# Patient Record
Sex: FEMALE | URBAN - METROPOLITAN AREA
[De-identification: names, ages, dates, MRNs, and addresses within clinical notes are randomized per-mention and may not be internally consistent; named-entity substitution may affect disease eponyms.]

---

## 2018-12-03 ENCOUNTER — APPOINTMENT (RX ONLY)
Dept: URBAN - METROPOLITAN AREA CLINIC 56 | Facility: CLINIC | Age: 83
Setting detail: DERMATOLOGY
End: 2018-12-03

## 2018-12-03 DIAGNOSIS — D22 MELANOCYTIC NEVI: ICD-10-CM

## 2018-12-03 DIAGNOSIS — D18.0 HEMANGIOMA: ICD-10-CM

## 2018-12-03 DIAGNOSIS — L82.1 OTHER SEBORRHEIC KERATOSIS: ICD-10-CM

## 2018-12-03 DIAGNOSIS — L81.4 OTHER MELANIN HYPERPIGMENTATION: ICD-10-CM

## 2018-12-03 DIAGNOSIS — Z85.828 PERSONAL HISTORY OF OTHER MALIGNANT NEOPLASM OF SKIN: ICD-10-CM

## 2018-12-03 DIAGNOSIS — L57.8 OTHER SKIN CHANGES DUE TO CHRONIC EXPOSURE TO NONIONIZING RADIATION: ICD-10-CM

## 2018-12-03 PROBLEM — D48.5 NEOPLASM OF UNCERTAIN BEHAVIOR OF SKIN: Status: ACTIVE | Noted: 2018-12-03

## 2018-12-03 PROBLEM — D18.01 HEMANGIOMA OF SKIN AND SUBCUTANEOUS TISSUE: Status: ACTIVE | Noted: 2018-12-03

## 2018-12-03 PROBLEM — D22.62 MELANOCYTIC NEVI OF LEFT UPPER LIMB, INCLUDING SHOULDER: Status: ACTIVE | Noted: 2018-12-03

## 2018-12-03 PROCEDURE — 99214 OFFICE O/P EST MOD 30 MIN: CPT | Mod: 25

## 2018-12-03 PROCEDURE — ? SUNSCREEN RECOMMENDATIONS

## 2018-12-03 PROCEDURE — 69100 BIOPSY OF EXTERNAL EAR: CPT

## 2018-12-03 PROCEDURE — ? COUNSELING

## 2018-12-03 PROCEDURE — 11101: CPT

## 2018-12-03 PROCEDURE — ? BIOPSY BY SHAVE METHOD

## 2018-12-03 PROCEDURE — 11100: CPT

## 2018-12-03 ASSESSMENT — LOCATION DETAILED DESCRIPTION DERM
LOCATION DETAILED: RIGHT ANTERIOR PROXIMAL UPPER ARM
LOCATION DETAILED: RIGHT LATERAL ABDOMEN
LOCATION DETAILED: LEFT ANTECUBITAL SKIN
LOCATION DETAILED: RIGHT LATERAL SUPERIOR CHEST
LOCATION DETAILED: RIGHT ANTERIOR DISTAL UPPER ARM

## 2018-12-03 ASSESSMENT — LOCATION ZONE DERM
LOCATION ZONE: TRUNK
LOCATION ZONE: ARM

## 2018-12-03 ASSESSMENT — LOCATION SIMPLE DESCRIPTION DERM
LOCATION SIMPLE: LEFT UPPER ARM
LOCATION SIMPLE: ABDOMEN
LOCATION SIMPLE: CHEST
LOCATION SIMPLE: RIGHT UPPER ARM

## 2018-12-03 NOTE — PROCEDURE: BIOPSY BY SHAVE METHOD
Biopsy Method: Personna blade
Render Post-Care Instructions In Note?: no
Dressing: Band-Aid
Electrodesiccation Text: The wound bed was treated with electrodesiccation after the biopsy was performed.
Detail Level: Detailed
Anesthesia Type: 1% lidocaine with 1:200,000 epinephrine
X Size Of Lesion In Cm: 0
Anesthesia Volume In Cc (Will Not Render If 0): 0.5
Silver Nitrate Text: The wound bed was treated with silver nitrate after the biopsy was performed.
Billing Type: Third-Party Bill
Consent: Written consent was obtained and risks were reviewed including but not limited to scarring, infection, bleeding, scabbing, incomplete removal, nerve damage and allergy to anesthesia.
Curettage Text: The wound bed was treated with curettage after the biopsy was performed.
Hemostasis: Drysol and Monsel's
Cryotherapy Text: The wound bed was treated with cryotherapy after the biopsy was performed.
Type Of Destruction Used: Electrodesiccation and Curettage
Electrodesiccation And Curettage Text: The wound bed was treated with electrodesiccation and curettage after the biopsy was performed.
Notification Instructions: Patient will be notified of biopsy results. However, patient instructed to call the office if not contacted within 2 weeks.
Wound Care: Petrolatum
Lab: 6
Lab Facility: 3
Post-Care Instructions: I reviewed with the patient in detail post-care instructions. Patient is to  cleanse the biopsy site with mild soap and water twice a day, and then apply Vaseline (petroleum jelly) twice daily until healed.
Was A Bandage Applied: Yes
Depth Of Biopsy: dermis
Biopsy Type: H and E

## 2019-01-03 ENCOUNTER — APPOINTMENT (RX ONLY)
Dept: URBAN - METROPOLITAN AREA CLINIC 56 | Facility: CLINIC | Age: 84
Setting detail: DERMATOLOGY
End: 2019-01-03

## 2019-01-03 PROBLEM — C44.319 BASAL CELL CARCINOMA OF SKIN OF OTHER PARTS OF FACE: Status: ACTIVE | Noted: 2019-01-03

## 2019-01-03 PROCEDURE — ? EXCISION

## 2019-01-03 PROCEDURE — 11642 EXC F/E/E/N/L MAL+MRG 1.1-2: CPT

## 2019-01-03 PROCEDURE — 13132 CMPLX RPR F/C/C/M/N/AX/G/H/F: CPT

## 2019-01-10 ENCOUNTER — APPOINTMENT (RX ONLY)
Dept: URBAN - METROPOLITAN AREA CLINIC 56 | Facility: CLINIC | Age: 84
Setting detail: DERMATOLOGY
End: 2019-01-10

## 2019-01-10 DIAGNOSIS — Z48.02 ENCOUNTER FOR REMOVAL OF SUTURES: ICD-10-CM

## 2019-01-10 PROCEDURE — 99024 POSTOP FOLLOW-UP VISIT: CPT

## 2019-01-10 PROCEDURE — ? SUTURE REMOVAL (GLOBAL PERIOD)

## 2019-01-10 ASSESSMENT — LOCATION DETAILED DESCRIPTION DERM: LOCATION DETAILED: RIGHT SUPERIOR CENTRAL BUCCAL CHEEK

## 2019-01-10 ASSESSMENT — LOCATION ZONE DERM: LOCATION ZONE: FACE

## 2019-01-10 ASSESSMENT — LOCATION SIMPLE DESCRIPTION DERM: LOCATION SIMPLE: RIGHT CHEEK

## 2019-01-10 NOTE — PROCEDURE: SUTURE REMOVAL (GLOBAL PERIOD)
Add 07081 Cpt? (Important Note: In 2017 The Use Of 95542 Is Being Tracked By Cms To Determine Future Global Period Reimbursement For Global Periods): yes
Detail Level: Simple

## 2019-01-25 ENCOUNTER — RX ONLY (OUTPATIENT)
Age: 84
Setting detail: RX ONLY
End: 2019-01-25

## 2019-01-25 RX ORDER — FLUOROURACIL 2 G/40G
CREAM TOPICAL
Qty: 1 | Refills: 0 | Status: ERX

## 2019-02-05 ENCOUNTER — APPOINTMENT (RX ONLY)
Dept: URBAN - METROPOLITAN AREA CLINIC 56 | Facility: CLINIC | Age: 84
Setting detail: DERMATOLOGY
End: 2019-02-05

## 2019-02-05 VITALS — DIASTOLIC BLOOD PRESSURE: 60 MMHG | SYSTOLIC BLOOD PRESSURE: 131 MMHG | HEART RATE: 64 BPM

## 2019-02-05 PROBLEM — C44.01 BASAL CELL CARCINOMA OF SKIN OF LIP: Status: ACTIVE | Noted: 2019-02-05

## 2019-02-05 PROCEDURE — ? MOHS SURGERY

## 2019-02-05 PROCEDURE — ? REPAIR NOTE

## 2019-02-05 PROCEDURE — 13152 CMPLX RPR E/N/E/L 2.6-7.5 CM: CPT

## 2019-02-05 PROCEDURE — 17311 MOHS 1 STAGE H/N/HF/G: CPT

## 2019-02-05 NOTE — HPI: PROCEDURE (MOHS)
Has The Growth Been Previously Biopsied?: has been previously biopsied
Additional History: Patient has a history of cold sores & anxiety.

## 2019-02-05 NOTE — PROCEDURE: MOHS SURGERY
Referred To Plastics For Closure Text (Leave Blank If You Do Not Want): After obtaining clear surgical margins the patient was sent to plastics for surgical repair.  The patient understands they will receive post-surgical care and follow-up from Dr. Cabrera.

## 2019-02-05 NOTE — PROCEDURE: MOHS SURGERY
Referred To Mid-Level For Closure Text (Leave Blank If You Do Not Want): After obtaining clear surgical margins the patient was sent to Jurgen Rizo PA-C for surgical repair.  The patient understands they will receive post-surgical care and follow-up from the mid-level provider.

## 2019-06-25 ENCOUNTER — APPOINTMENT (RX ONLY)
Dept: URBAN - METROPOLITAN AREA CLINIC 56 | Facility: CLINIC | Age: 84
Setting detail: DERMATOLOGY
End: 2019-06-25

## 2019-06-25 DIAGNOSIS — D18.0 HEMANGIOMA: ICD-10-CM

## 2019-06-25 DIAGNOSIS — L82.1 OTHER SEBORRHEIC KERATOSIS: ICD-10-CM

## 2019-06-25 DIAGNOSIS — L57.8 OTHER SKIN CHANGES DUE TO CHRONIC EXPOSURE TO NONIONIZING RADIATION: ICD-10-CM

## 2019-06-25 DIAGNOSIS — Z85.828 PERSONAL HISTORY OF OTHER MALIGNANT NEOPLASM OF SKIN: ICD-10-CM

## 2019-06-25 DIAGNOSIS — L81.4 OTHER MELANIN HYPERPIGMENTATION: ICD-10-CM

## 2019-06-25 DIAGNOSIS — D22 MELANOCYTIC NEVI: ICD-10-CM

## 2019-06-25 PROBLEM — D18.01 HEMANGIOMA OF SKIN AND SUBCUTANEOUS TISSUE: Status: ACTIVE | Noted: 2019-06-25

## 2019-06-25 PROBLEM — D22.62 MELANOCYTIC NEVI OF LEFT UPPER LIMB, INCLUDING SHOULDER: Status: ACTIVE | Noted: 2019-06-25

## 2019-06-25 PROCEDURE — ? INVENTORY

## 2019-06-25 PROCEDURE — 99213 OFFICE O/P EST LOW 20 MIN: CPT

## 2019-06-25 PROCEDURE — ? ADDITIONAL NOTES

## 2019-06-25 PROCEDURE — ? SUNSCREEN RECOMMENDATIONS

## 2019-06-25 PROCEDURE — ? COUNSELING

## 2019-06-25 ASSESSMENT — LOCATION DETAILED DESCRIPTION DERM
LOCATION DETAILED: RIGHT ANTERIOR PROXIMAL UPPER ARM
LOCATION DETAILED: RIGHT LATERAL ABDOMEN
LOCATION DETAILED: RIGHT LATERAL SUPERIOR CHEST
LOCATION DETAILED: RIGHT ANTERIOR DISTAL UPPER ARM
LOCATION DETAILED: LEFT ANTECUBITAL SKIN

## 2019-06-25 ASSESSMENT — LOCATION SIMPLE DESCRIPTION DERM
LOCATION SIMPLE: LEFT UPPER ARM
LOCATION SIMPLE: CHEST
LOCATION SIMPLE: RIGHT UPPER ARM
LOCATION SIMPLE: ABDOMEN

## 2019-06-25 ASSESSMENT — LOCATION ZONE DERM
LOCATION ZONE: ARM
LOCATION ZONE: TRUNK

## 2019-06-25 NOTE — PROCEDURE: MIPS QUALITY
Quality 47: Advance Care Plan: Advance Care Planning discussed and documented; advance care plan or surrogate decision maker documented in the medical record.
Quality 431: Preventive Care And Screening: Unhealthy Alcohol Use - Screening: Patient screened for unhealthy alcohol use using a single question and scores less than 2 times per year
Detail Level: Detailed
Quality 226: Preventive Care And Screening: Tobacco Use: Screening And Cessation Intervention: Patient screened for tobacco use and is an ex/non-smoker
Quality 130: Documentation Of Current Medications In The Medical Record: Current Medications Documented

## 2019-06-25 NOTE — PROCEDURE: ADDITIONAL NOTES
Additional Notes: Patient used Fluorouracil 5% cream twice daily for six weeks for residual basal cell carcinoma. No residual basal cell carcinoma seen today.
Detail Level: Simple

## 2021-05-05 NOTE — PROCEDURE: MOHS SURGERY
Received a call today from Alicia from [facility], who is looking for clinical information to authorize a surgery.   They have not received anything as of yet. Her history, comorbids, medical necessity for the surgery, etc.      We may have gotten info that it was approved, however, it is still pending.     Phone:  [phone]  Fax # [phone]   Refer #[id number]    Need clinical info asap (8am 5/6/2021).   If not received in time the case will go to the doctor for review and this can delay approval for surgery.    Subsequent Stages Histo Method Verbiage: Using a similar technique to that described above, a thin layer of tissue was removed from all areas where tumor was visible on the previous stage.  The tissue was again oriented, mapped, dyed, and processed as above.

## 2021-06-21 ENCOUNTER — APPOINTMENT (RX ONLY)
Dept: URBAN - METROPOLITAN AREA CLINIC 56 | Facility: CLINIC | Age: 86
Setting detail: DERMATOLOGY
End: 2021-06-21

## 2021-06-21 DIAGNOSIS — L82.1 OTHER SEBORRHEIC KERATOSIS: ICD-10-CM

## 2021-06-21 DIAGNOSIS — D22 MELANOCYTIC NEVI: ICD-10-CM

## 2021-06-21 DIAGNOSIS — L57.8 OTHER SKIN CHANGES DUE TO CHRONIC EXPOSURE TO NONIONIZING RADIATION: ICD-10-CM

## 2021-06-21 DIAGNOSIS — L81.4 OTHER MELANIN HYPERPIGMENTATION: ICD-10-CM

## 2021-06-21 DIAGNOSIS — D485 NEOPLASM OF UNCERTAIN BEHAVIOR OF SKIN: ICD-10-CM

## 2021-06-21 DIAGNOSIS — Z85.828 PERSONAL HISTORY OF OTHER MALIGNANT NEOPLASM OF SKIN: ICD-10-CM

## 2021-06-21 DIAGNOSIS — D18.0 HEMANGIOMA: ICD-10-CM

## 2021-06-21 PROBLEM — D18.01 HEMANGIOMA OF SKIN AND SUBCUTANEOUS TISSUE: Status: ACTIVE | Noted: 2021-06-21

## 2021-06-21 PROBLEM — D22.5 MELANOCYTIC NEVI OF TRUNK: Status: ACTIVE | Noted: 2021-06-21

## 2021-06-21 PROBLEM — D48.5 NEOPLASM OF UNCERTAIN BEHAVIOR OF SKIN: Status: ACTIVE | Noted: 2021-06-21

## 2021-06-21 PROCEDURE — ? ADDITIONAL NOTES

## 2021-06-21 PROCEDURE — ? SUNSCREEN RECOMMENDATIONS

## 2021-06-21 PROCEDURE — 11102 TANGNTL BX SKIN SINGLE LES: CPT

## 2021-06-21 PROCEDURE — ? COUNSELING

## 2021-06-21 PROCEDURE — ? BIOPSY BY SHAVE METHOD

## 2021-06-21 PROCEDURE — 99213 OFFICE O/P EST LOW 20 MIN: CPT | Mod: 25

## 2021-06-21 PROCEDURE — ? TREATMENT REGIMEN

## 2021-06-21 ASSESSMENT — LOCATION ZONE DERM
LOCATION ZONE: LEG
LOCATION ZONE: FACE
LOCATION ZONE: TRUNK
LOCATION ZONE: ARM

## 2021-06-21 ASSESSMENT — LOCATION SIMPLE DESCRIPTION DERM
LOCATION SIMPLE: LEFT FOREARM
LOCATION SIMPLE: LEFT LOWER BACK
LOCATION SIMPLE: RIGHT FOREARM
LOCATION SIMPLE: RIGHT PRETIBIAL REGION
LOCATION SIMPLE: CHEST
LOCATION SIMPLE: LEFT PRETIBIAL REGION
LOCATION SIMPLE: LEFT CHEEK
LOCATION SIMPLE: LEFT FOREHEAD
LOCATION SIMPLE: UPPER BACK

## 2021-06-21 ASSESSMENT — LOCATION DETAILED DESCRIPTION DERM
LOCATION DETAILED: LEFT INFERIOR MEDIAL MIDBACK
LOCATION DETAILED: STERNAL NOTCH
LOCATION DETAILED: LEFT VENTRAL DISTAL FOREARM
LOCATION DETAILED: INFERIOR THORACIC SPINE
LOCATION DETAILED: RIGHT VENTRAL DISTAL FOREARM
LOCATION DETAILED: LEFT INFERIOR MEDIAL FOREHEAD
LOCATION DETAILED: RIGHT DISTAL PRETIBIAL REGION
LOCATION DETAILED: LEFT SUPERIOR MEDIAL MIDBACK
LOCATION DETAILED: SUPERIOR THORACIC SPINE
LOCATION DETAILED: LEFT INFERIOR PREAURICULAR CHEEK
LOCATION DETAILED: LEFT DISTAL PRETIBIAL REGION

## 2021-06-21 NOTE — PROCEDURE: BIOPSY BY SHAVE METHOD
Detail Level: Detailed
Depth Of Biopsy: dermis
Was A Bandage Applied: Yes
Size Of Lesion In Cm: 0
Biopsy Type: H and E
Biopsy Method: Personna blade
Anesthesia Type: 1% lidocaine with 1:200,000 epinephrine
Anesthesia Volume In Cc (Will Not Render If 0): 1
Hemostasis: Drysol and Monsel's
Wound Care: Petrolatum
Dressing: Band-Aid
Destruction After The Procedure: No
Type Of Destruction Used: Curettage
Curettage Text: The wound bed was treated with curettage after the biopsy was performed.
Cryotherapy Text: The wound bed was treated with cryotherapy after the biopsy was performed.
Electrodesiccation Text: The wound bed was treated with electrodesiccation after the biopsy was performed.
Electrodesiccation And Curettage Text: The wound bed was treated with electrodesiccation and curettage after the biopsy was performed.
Silver Nitrate Text: The wound bed was treated with silver nitrate after the biopsy was performed.
Lab: 6
Lab Facility: 3
Path Notes (To The Dermatopathologist): ***PER PROVIDER ONLY DR. CROOKED TO READ***
Consent: Written consent was obtained and risks were reviewed including but not limited to scarring, infection, bleeding, scabbing, incomplete removal, nerve damage and allergy to anesthesia.
Post-Care Instructions: I reviewed with the patient in detail post-care instructions. Patient is to keep the biopsy site clean with soapy water, and then apply vaseline once daily until healed. Patient may apply hydrogen peroxide soaks to remove any crusting.
Notification Instructions: Patient will be notified of biopsy results. However, patient instructed to call the office if not contacted within 2 weeks.
Billing Type: Third-Party Bill
Information: Selecting Yes will display possible errors in your note based on the variables you have selected. This validation is only offered as a suggestion for you. PLEASE NOTE THAT THE VALIDATION TEXT WILL BE REMOVED WHEN YOU FINALIZE YOUR NOTE. IF YOU WANT TO FAX A PRELIMINARY NOTE YOU WILL NEED TO TOGGLE THIS TO 'NO' IF YOU DO NOT WANT IT IN YOUR FAXED NOTE.

## 2021-08-04 ENCOUNTER — APPOINTMENT (RX ONLY)
Dept: URBAN - METROPOLITAN AREA CLINIC 56 | Facility: CLINIC | Age: 86
Setting detail: DERMATOLOGY
End: 2021-08-04

## 2021-08-04 VITALS — HEART RATE: 70 BPM | SYSTOLIC BLOOD PRESSURE: 134 MMHG | DIASTOLIC BLOOD PRESSURE: 54 MMHG

## 2021-08-04 PROBLEM — C44.319 BASAL CELL CARCINOMA OF SKIN OF OTHER PARTS OF FACE: Status: ACTIVE | Noted: 2021-08-04

## 2021-08-04 PROCEDURE — ? ADDITIONAL NOTES

## 2021-08-04 PROCEDURE — 17311 MOHS 1 STAGE H/N/HF/G: CPT

## 2021-08-04 PROCEDURE — ? REPAIR NOTE

## 2021-08-04 PROCEDURE — ? MOHS SURGERY

## 2021-08-04 PROCEDURE — 13132 CMPLX RPR F/C/C/M/N/AX/G/H/F: CPT

## 2021-08-04 NOTE — PROCEDURE: MOHS SURGERY
Doxycycline Counseling:  Patient counseled regarding possible photosensitivity and increased risk for sunburn.  Patient instructed to avoid sunlight, if possible.  When exposed to sunlight, patients should wear protective clothing, sunglasses, and sunscreen.  The patient was instructed to call the office immediately if the following severe adverse effects occur:  hearing changes, easy bruising/bleeding, severe headache, or vision changes.  The patient verbalized understanding of the proper use and possible adverse effects of doxycycline.  All of the patient's questions and concerns were addressed. Erivedge Pregnancy And Lactation Text: This medication is Pregnancy Category X and is absolutely contraindicated during pregnancy. It is unknown if it is excreted in breast milk. Enbrel Pregnancy And Lactation Text: This medication is Pregnancy Category B and is considered safe during pregnancy. It is unknown if this medication is excreted in breast milk. Doxepin Pregnancy And Lactation Text: This medication is Pregnancy Category C and it isn't known if it is safe during pregnancy. It is also excreted in breast milk and breast feeding isn't recommended. Acitretin Pregnancy And Lactation Text: This medication is Pregnancy Category X and should not be given to women who are pregnant or may become pregnant in the future. This medication is excreted in breast milk. Sski Pregnancy And Lactation Text: This medication is Pregnancy Category D and isn't considered safe during pregnancy. It is excreted in breast milk. Cyclophosphamide Pregnancy And Lactation Text: This medication is Pregnancy Category D and it isn't considered safe during pregnancy. This medication is excreted in breast milk. Minoxidil Counseling: Minoxidil is a topical medication which can increase blood flow where it is applied. It is uncertain how this medication increases hair growth. Side effects are uncommon and include stinging and allergic reactions. Carac Counseling:  I discussed with the patient the risks of Carac including but not limited to erythema, scaling, itching, weeping, crusting, and pain. Rifampin Counseling: I discussed with the patient the risks of rifampin including but not limited to liver damage, kidney damage, red-orange body fluids, nausea/vomiting and severe allergy. Arava Counseling:  Patient counseled regarding adverse effects of Arava including but not limited to nausea, vomiting, abnormalities in liver function tests. Patients may develop mouth sores, rash, diarrhea, and abnormalities in blood counts. The patient understands that monitoring is required including LFTs and blood counts.  There is a rare possibility of scarring of the liver and lung problems that can occur when taking methotrexate. Persistent nausea, loss of appetite, pale stools, dark urine, cough, and shortness of breath should be reported immediately. Patient advised to discontinue Arava treatment and consult with a physician prior to attempting conception. The patient will have to undergo a treatment to eliminate Arava from the body prior to conception. Xeljanz Counseling: I discussed with the patient the risks of Xeljanz therapy including increased risk of infection, liver issues, headache, diarrhea, or cold symptoms. Live vaccines should be avoided. They were instructed to call if they have any problems. Tazorac Counseling:  Patient advised that medication is irritating and drying.  Patient may need to apply sparingly and wash off after an hour before eventually leaving it on overnight.  The patient verbalized understanding of the proper use and possible adverse effects of tazorac.  All of the patient's questions and concerns were addressed. Siliq Pregnancy And Lactation Text: The risk during pregnancy and breastfeeding is uncertain with this medication. Itraconazole Pregnancy And Lactation Text: This medication is Pregnancy Category C and it isn't know if it is safe during pregnancy. It is also excreted in breast milk. Gabapentin Counseling: I discussed with the patient the risks of gabapentin including but not limited to dizziness, somnolence, fatigue and ataxia. Azithromycin Counseling:  I discussed with the patient the risks of azithromycin including but not limited to GI upset, allergic reaction, drug rash, diarrhea, and yeast infections. Humira Counseling:  I discussed with the patient the risks of adalimumab including but not limited to myelosuppression, immunosuppression, autoimmune hepatitis, demyelinating diseases, lymphoma, and serious infections.  The patient understands that monitoring is required including a PPD at baseline and must alert us or the primary physician if symptoms of infection or other concerning signs are noted. Otezla Counseling: The side effects of Otezla were discussed with the patient, including but not limited to worsening or new depression, weight loss, diarrhea, nausea, upper respiratory tract infection, and headache. Patient instructed to call the office should any adverse effect occur.  The patient verbalized understanding of the proper use and possible adverse effects of Otezla.  All the patient's questions and concerns were addressed. Xelshyannez Pregnancy And Lactation Text: This medication is Pregnancy Category D and is not considered safe during pregnancy.  The risk during breast feeding is also uncertain. Doxycycline Pregnancy And Lactation Text: This medication is Pregnancy Category D and not consider safe during pregnancy. It is also excreted in breast milk but is considered safe for shorter treatment courses. Simponi Counseling:  I discussed with the patient the risks of golimumab including but not limited to myelosuppression, immunosuppression, autoimmune hepatitis, demyelinating diseases, lymphoma, and serious infections.  The patient understands that monitoring is required including a PPD at baseline and must alert us or the primary physician if symptoms of infection or other concerning signs are noted. Hydroxyzine Counseling: Patient advised that the medication is sedating and not to drive a car after taking this medication.  Patient informed of potential adverse effects including but not limited to dry mouth, urinary retention, and blurry vision.  The patient verbalized understanding of the proper use and possible adverse effects of hydroxyzine.  All of the patient's questions and concerns were addressed. Minoxidil Pregnancy And Lactation Text: This medication has not been assigned a Pregnancy Risk Category but animal studies failed to show danger with the topical medication. It is unknown if the medication is excreted in breast milk. Bexarotene Counseling:  I discussed with the patient the risks of bexarotene including but not limited to hair loss, dry lips/skin/eyes, liver abnormalities, hyperlipidemia, pancreatitis, depression/suicidal ideation, photosensitivity, drug rash/allergic reactions, hypothyroidism, anemia, leukopenia, infection, cataracts, and teratogenicity.  Patient understands that they will need regular blood tests to check lipid profile, liver function tests, white blood cell count, thyroid function tests and pregnancy test if applicable. Thalidomide Counseling: I discussed with the patient the risks of thalidomide including but not limited to birth defects, anxiety, weakness, chest pain, dizziness, cough and severe allergy. Rifampin Pregnancy And Lactation Text: This medication is Pregnancy Category C and it isn't know if it is safe during pregnancy. It is also excreted in breast milk and should not be used if you are breast feeding. Tazorac Pregnancy And Lactation Text: This medication is not safe during pregnancy. It is unknown if this medication is excreted in breast milk. Ketoconazole Counseling:   Patient counseled regarding improving absorption with orange juice.  Adverse effects include but are not limited to breast enlargement, headache, diarrhea, nausea, upset stomach, liver function test abnormalities, taste disturbance, and stomach pain.  There is a rare possibility of liver failure that can occur when taking ketoconazole. The patient understands that monitoring of LFTs may be required, especially at baseline. The patient verbalized understanding of the proper use and possible adverse effects of ketoconazole.  All of the patient's questions and concerns were addressed. Bexarotene Pregnancy And Lactation Text: This medication is Pregnancy Category X and should not be given to women who are pregnant or may become pregnant. This medication should not be used if you are breast feeding. Gabapentin Pregnancy And Lactation Text: This medication is Pregnancy Category C and isn't considered safe during pregnancy. It is excreted in breast milk. Erythromycin Counseling:  I discussed with the patient the risks of erythromycin including but not limited to GI upset, allergic reaction, drug rash, diarrhea, increase in liver enzymes, and yeast infections. Cyclosporine Counseling:  I discussed with the patient the risks of cyclosporine including but not limited to hypertension, gingival hyperplasia,myelosuppression, immunosuppression, liver damage, kidney damage, neurotoxicity, lymphoma, and serious infections. The patient understands that monitoring is required including baseline blood pressure, CBC, CMP, lipid panel and uric acid, and then 1-2 times monthly CMP and blood pressure. Carac Pregnancy And Lactation Text: This medication is Pregnancy Category X and contraindicated in pregnancy and in women who may become pregnant. It is unknown if this medication is excreted in breast milk. Tetracycline Counseling: Patient counseled regarding possible photosensitivity and increased risk for sunburn.  Patient instructed to avoid sunlight, if possible.  When exposed to sunlight, patients should wear protective clothing, sunglasses, and sunscreen.  The patient was instructed to call the office immediately if the following severe adverse effects occur:  hearing changes, easy bruising/bleeding, severe headache, or vision changes.  The patient verbalized understanding of the proper use and possible adverse effects of tetracycline.  All of the patient's questions and concerns were addressed. Patient understands to avoid pregnancy while on therapy due to potential birth defects. Otezla Pregnancy And Lactation Text: This medication is Pregnancy Category C and it isn't known if it is safe during pregnancy. It is unknown if it is excreted in breast milk. Picato Counseling:  I discussed with the patient the risks of Picato including but not limited to erythema, scaling, itching, weeping, crusting, and pain. Cyclosporine Pregnancy And Lactation Text: This medication is Pregnancy Category C and it isn't know if it is safe during pregnancy. This medication is excreted in breast milk. Azithromycin Pregnancy And Lactation Text: This medication is considered safe during pregnancy and is also secreted in breast milk. Ketoconazole Pregnancy And Lactation Text: This medication is Pregnancy Category C and it isn't know if it is safe during pregnancy. It is also excreted in breast milk and breast feeding isn't recommended. Clofazimine Counseling:  I discussed with the patient the risks of clofazimine including but not limited to skin and eye pigmentation, liver damage, nausea/vomiting, gastrointestinal bleeding and allergy. Xolair Counseling:  Patient informed of potential adverse effects including but not limited to fever, muscle aches, rash and allergic reactions.  The patient verbalized understanding of the proper use and possible adverse effects of Xolair.  All of the patient's questions and concerns were addressed. Topical Clindamycin Counseling: Patient counseled that this medication may cause skin irritation or allergic reactions.  In the event of skin irritation, the patient was advised to reduce the amount of the drug applied or use it less frequently.   The patient verbalized understanding of the proper use and possible adverse effects of clindamycin.  All of the patient's questions and concerns were addressed. Hydroxyzine Pregnancy And Lactation Text: This medication is not safe during pregnancy and should not be taken. It is also excreted in breast milk and breast feeding isn't recommended. Erythromycin Pregnancy And Lactation Text: This medication is Pregnancy Category B and is considered safe during pregnancy. It is also excreted in breast milk. Glycopyrrolate Counseling:  I discussed with the patient the risks of glycopyrrolate including but not limited to skin rash, drowsiness, dry mouth, difficulty urinating, and blurred vision. Include Pregnancy/Lactation Warning?: No Cimzia Counseling:  I discussed with the patient the risks of Cimzia including but not limited to immunosuppression, allergic reactions and infections.  The patient understands that monitoring is required including a PPD at baseline and must alert us or the primary physician if symptoms of infection or other concerning signs are noted. Xolair Pregnancy And Lactation Text: This medication is Pregnancy Category B and is considered safe during pregnancy. This medication is excreted in breast milk. Bactrim Counseling:  I discussed with the patient the risks of sulfa antibiotics including but not limited to GI upset, allergic reaction, drug rash, diarrhea, dizziness, photosensitivity, and yeast infections.  Rarely, more serious reactions can occur including but not limited to aplastic anemia, agranulocytosis, methemoglobinemia, blood dyscrasias, liver or kidney failure, lung infiltrates or desquamative/blistering drug rashes. 5-Fu Counseling: 5-Fluorouracil Counseling:  I discussed with the patient the risks of 5-fluorouracil including but not limited to erythema, scaling, itching, weeping, crusting, and pain. Ilumya Counseling: I discussed with the patient the risks of tildrakizumab including but not limited to immunosuppression, malignancy, posterior leukoencephalopathy syndrome, and serious infections.  The patient understands that monitoring is required including a PPD at baseline and must alert us or the primary physician if symptoms of infection or other concerning signs are noted. Picato Pregnancy And Lactation Text: This medication is Pregnancy Category C. It is unknown if this medication is excreted in breast milk. Oxybutynin Counseling:  I discussed with the patient the risks of oxybutynin including but not limited to skin rash, drowsiness, dry mouth, difficulty urinating, and blurred vision. Isotretinoin Counseling: Patient should get monthly blood tests, not donate blood, not drive at night if vision affected, not share medication, and not undergo elective surgery for 6 months after tx completed. Side effects reviewed, pt to contact office should one occur. Methotrexate Counseling:  Patient counseled regarding adverse effects of methotrexate including but not limited to nausea, vomiting, abnormalities in liver function tests. Patients may develop mouth sores, rash, diarrhea, and abnormalities in blood counts. The patient understands that monitoring is required including LFT's and blood counts.  There is a rare possibility of scarring of the liver and lung problems that can occur when taking methotrexate. Persistent nausea, loss of appetite, pale stools, dark urine, cough, and shortness of breath should be reported immediately. Patient advised to discontinue methotrexate treatment at least three months before attempting to become pregnant.  I discussed the need for folate supplements while taking methotrexate.  These supplements can decrease side effects during methotrexate treatment. The patient verbalized understanding of the proper use and possible adverse effects of methotrexate.  All of the patient's questions and concerns were addressed. Tetracycline Pregnancy And Lactation Text: This medication is Pregnancy Category D and not consider safe during pregnancy. It is also excreted in breast milk. Cimzia Pregnancy And Lactation Text: This medication crosses the placenta but can be considered safe in certain situations. Cimzia may be excreted in breast milk. Topical Clindamycin Pregnancy And Lactation Text: This medication is Pregnancy Category B and is considered safe during pregnancy. It is unknown if it is excreted in breast milk. Stelara Counseling:  I discussed with the patient the risks of ustekinumab including but not limited to immunosuppression, malignancy, posterior leukoencephalopathy syndrome, and serious infections.  The patient understands that monitoring is required including a PPD at baseline and must alert us or the primary physician if symptoms of infection or other concerning signs are noted. Terbinafine Counseling: Patient counseling regarding adverse effects of terbinafine including but not limited to headache, diarrhea, rash, upset stomach, liver function test abnormalities, itching, taste/smell disturbance, nausea, abdominal pain, and flatulence.  There is a rare possibility of liver failure that can occur when taking terbinafine.  The patient understands that a baseline LFT and kidney function test may be required. The patient verbalized understanding of the proper use and possible adverse effects of terbinafine.  All of the patient's questions and concerns were addressed. Glycopyrrolate Pregnancy And Lactation Text: This medication is Pregnancy Category B and is considered safe during pregnancy. It is unknown if it is excreted breast milk. Bactrim Pregnancy And Lactation Text: This medication is Pregnancy Category D and is known to cause fetal risk.  It is also excreted in breast milk. Valtrex Counseling: I discussed with the patient the risks of valacyclovir including but not limited to kidney damage, nausea, vomiting and severe allergy.  The patient understands that if the infection seems to be worsening or is not improving, they are to call. Metronidazole Counseling:  I discussed with the patient the risks of metronidazole including but not limited to seizures, nausea/vomiting, a metallic taste in the mouth, nausea/vomiting and severe allergy. Isotretinoin Pregnancy And Lactation Text: This medication is Pregnancy Category X and is considered extremely dangerous during pregnancy. It is unknown if it is excreted in breast milk. Protopic Counseling: Patient may experience a mild burning sensation during topical application. Protopic is not approved in children less than 2 years of age. There have been case reports of hematologic and skin malignancies in patients using topical calcineurin inhibitors although causality is questionable. Albendazole Counseling:  I discussed with the patient the risks of albendazole including but not limited to cytopenia, kidney damage, nausea/vomiting and severe allergy.  The patient understands that this medication is being used in an off-label manner. Colchicine Counseling:  Patient counseled regarding adverse effects including but not limited to stomach upset (nausea, vomiting, stomach pain, or diarrhea).  Patient instructed to limit alcohol consumption while taking this medication.  Colchicine may reduce blood counts especially with prolonged use.  The patient understands that monitoring of kidney function and blood counts may be required, especially at baseline. The patient verbalized understanding of the proper use and possible adverse effects of colchicine.  All of the patient's questions and concerns were addressed. Hemigard Postcare Instructions: The HEMIGARD strips are to remain completely dry for at least 5-7 days. Cosentyx Counseling:  I discussed with the patient the risks of Cosentyx including but not limited to worsening of Crohn's disease, immunosuppression, allergic reactions and infections.  The patient understands that monitoring is required including a PPD at baseline and must alert us or the primary physician if symptoms of infection or other concerning signs are noted. Topical Sulfur Applications Counseling: Topical Sulfur Counseling: Patient counseled that this medication may cause skin irritation or allergic reactions.  In the event of skin irritation, the patient was advised to reduce the amount of the drug applied or use it less frequently.   The patient verbalized understanding of the proper use and possible adverse effects of topical sulfur application.  All of the patient's questions and concerns were addressed. Terbinafine Pregnancy And Lactation Text: This medication is Pregnancy Category B and is considered safe during pregnancy. It is also excreted in breast milk and breast feeding isn't recommended. High Dose Vitamin A Counseling: Side effects reviewed, pt to contact office should one occur. Hydroxychloroquine Counseling:  I discussed with the patient that a baseline ophthalmologic exam is needed at the start of therapy and every year thereafter while on therapy. A CBC may also be warranted for monitoring.  The side effects of this medication were discussed with the patient, including but not limited to agranulocytosis, aplastic anemia, seizures, rashes, retinopathy, and liver toxicity. Patient instructed to call the office should any adverse effect occur.  The patient verbalized understanding of the proper use and possible adverse effects of Plaquenil.  All the patient's questions and concerns were addressed. Metronidazole Pregnancy And Lactation Text: This medication is Pregnancy Category B and considered safe during pregnancy.  It is also excreted in breast milk. Methotrexate Pregnancy And Lactation Text: This medication is Pregnancy Category X and is known to cause fetal harm. This medication is excreted in breast milk. Drysol Counseling:  I discussed with the patient the risks of drysol/aluminum chloride including but not limited to skin rash, itching, irritation, burning. Valtrex Pregnancy And Lactation Text: this medication is Pregnancy Category B and is considered safe during pregnancy. This medication is not directly found in breast milk but it's metabolite acyclovir is present. Fluconazole Counseling:  Patient counseled regarding adverse effects of fluconazole including but not limited to headache, diarrhea, nausea, upset stomach, liver function test abnormalities, taste disturbance, and stomach pain.  There is a rare possibility of liver failure that can occur when taking fluconazole.  The patient understands that monitoring of LFTs and kidney function test may be required, especially at baseline. The patient verbalized understanding of the proper use and possible adverse effects of fluconazole.  All of the patient's questions and concerns were addressed. Birth Control Pills Counseling: Birth Control Pill Counseling: I discussed with the patient the potential side effects of OCPs including but not limited to increased risk of stroke, heart attack, thrombophlebitis, deep venous thrombosis, hepatic adenomas, breast changes, GI upset, headaches, and depression.  The patient verbalized understanding of the proper use and possible adverse effects of OCPs. All of the patient's questions and concerns were addressed. Prednisone Counseling:  I discussed with the patient the risks of prolonged use of prednisone including but not limited to weight gain, insomnia, osteoporosis, mood changes, diabetes, susceptibility to infection, glaucoma and high blood pressure.  In cases where prednisone use is prolonged, patients should be monitored with blood pressure checks, serum glucose levels and an eye exam.  Additionally, the patient may need to be placed on GI prophylaxis, PCP prophylaxis, and calcium and vitamin D supplementation and/or a bisphosphonate.  The patient verbalized understanding of the proper use and the possible adverse effects of prednisone.  All of the patient's questions and concerns were addressed. Infliximab Counseling:  I discussed with the patient the risks of infliximab including but not limited to myelosuppression, immunosuppression, autoimmune hepatitis, demyelinating diseases, lymphoma, and serious infections.  The patient understands that monitoring is required including a PPD at baseline and must alert us or the primary physician if symptoms of infection or other concerning signs are noted. Cephalexin Counseling: I counseled the patient regarding use of cephalexin as an antibiotic for prophylactic and/or therapeutic purposes. Cephalexin (commonly prescribed under brand name Keflex) is a cephalosporin antibiotic which is active against numerous classes of bacteria, including most skin bacteria. Side effects may include nausea, diarrhea, gastrointestinal upset, rash, hives, yeast infections, and in rare cases, hepatitis, kidney disease, seizures, fever, confusion, neurologic symptoms, and others. Patients with severe allergies to penicillin medications are cautioned that there is about a 10% incidence of cross-reactivity with cephalosporins. When possible, patients with penicillin allergies should use alternatives to cephalosporins for antibiotic therapy. Protopic Pregnancy And Lactation Text: This medication is Pregnancy Category C. It is unknown if this medication is excreted in breast milk when applied topically. Azathioprine Counseling:  I discussed with the patient the risks of azathioprine including but not limited to myelosuppression, immunosuppression, hepatotoxicity, lymphoma, and infections.  The patient understands that monitoring is required including baseline LFTs, Creatinine, possible TPMP genotyping and weekly CBCs for the first month and then every 2 weeks thereafter.  The patient verbalized understanding of the proper use and possible adverse effects of azathioprine.  All of the patient's questions and concerns were addressed. Albendazole Pregnancy And Lactation Text: This medication is Pregnancy Category C and it isn't known if it is safe during pregnancy. It is also excreted in breast milk. Topical Sulfur Applications Pregnancy And Lactation Text: This medication is Pregnancy Category C and has an unknown safety profile during pregnancy. It is unknown if this topical medication is excreted in breast milk. Minocycline Counseling: Patient advised regarding possible photosensitivity and discoloration of the teeth, skin, lips, tongue and gums.  Patient instructed to avoid sunlight, if possible.  When exposed to sunlight, patients should wear protective clothing, sunglasses, and sunscreen.  The patient was instructed to call the office immediately if the following severe adverse effects occur:  hearing changes, easy bruising/bleeding, severe headache, or vision changes.  The patient verbalized understanding of the proper use and possible adverse effects of minocycline.  All of the patient's questions and concerns were addressed. Hydroquinone Counseling:  Patient advised that medication may result in skin irritation, lightening (hypopigmentation), dryness, and burning.  In the event of skin irritation, the patient was advised to reduce the amount of the drug applied or use it less frequently.  Rarely, spots that are treated with hydroquinone can become darker (pseudoochronosis).  Should this occur, patient instructed to stop medication and call the office. The patient verbalized understanding of the proper use and possible adverse effects of hydroquinone.  All of the patient's questions and concerns were addressed. Hydroxychloroquine Pregnancy And Lactation Text: This medication has been shown to cause fetal harm but it isn't assigned a Pregnancy Risk Category. There are small amounts excreted in breast milk. Azathioprine Pregnancy And Lactation Text: This medication is Pregnancy Category D and isn't considered safe during pregnancy. It is unknown if this medication is excreted in breast milk. Drysol Pregnancy And Lactation Text: This medication is considered safe during pregnancy and breast feeding. Solaraze Counseling:  I discussed with the patient the risks of Solaraze including but not limited to erythema, scaling, itching, weeping, crusting, and pain. Taltz Counseling: I discussed with the patient the risks of ixekizumab including but not limited to immunosuppression, serious infections, worsening of inflammatory bowel disease and drug reactions.  The patient understands that monitoring is required including a PPD at baseline and must alert us or the primary physician if symptoms of infection or other concerning signs are noted. High Dose Vitamin A Pregnancy And Lactation Text: High dose vitamin A therapy is contraindicated during pregnancy and breast feeding. Birth Control Pills Pregnancy And Lactation Text: This medication should be avoided if pregnant and for the first 30 days post-partum. Dapsone Counseling: I discussed with the patient the risks of dapsone including but not limited to hemolytic anemia, agranulocytosis, rashes, methemoglobinemia, kidney failure, peripheral neuropathy, headaches, GI upset, and liver toxicity.  Patients who start dapsone require monitoring including baseline LFTs and weekly CBCs for the first month, then every month thereafter.  The patient verbalized understanding of the proper use and possible adverse effects of dapsone.  All of the patient's questions and concerns were addressed. Ivermectin Counseling:  Patient instructed to take medication on an empty stomach with a full glass of water.  Patient informed of potential adverse effects including but not limited to nausea, diarrhea, dizziness, itching, and swelling of the extremities or lymph nodes.  The patient verbalized understanding of the proper use and possible adverse effects of ivermectin.  All of the patient's questions and concerns were addressed. Elidel Counseling: Patient may experience a mild burning sensation during topical application. Elidel is not approved in children less than 2 years of age. There have been case reports of hematologic and skin malignancies in patients using topical calcineurin inhibitors although causality is questionable. Nsaids Counseling: NSAID Counseling: I discussed with the patient that NSAIDs should be taken with food. Prolonged use of NSAIDs can result in the development of stomach ulcers.  Patient advised to stop taking NSAIDs if abdominal pain occurs.  The patient verbalized understanding of the proper use and possible adverse effects of NSAIDs.  All of the patient's questions and concerns were addressed. Dupixent Counseling: I discussed with the patient the risks of dupilumab including but not limited to eye infection and irritation, cold sores, injection site reactions, worsening of asthma, allergic reactions and increased risk of parasitic infection.  Live vaccines should be avoided while taking dupilumab. Dupilumab will also interact with certain medications such as warfarin and cyclosporine. The patient understands that monitoring is required and they must alert us or the primary physician if symptoms of infection or other concerning signs are noted. Zyclara Counseling:  I discussed with the patient the risks of imiquimod including but not limited to erythema, scaling, itching, weeping, crusting, and pain.  Patient understands that the inflammatory response to imiquimod is variable from person to person and was educated regarded proper titration schedule.  If flu-like symptoms develop, patient knows to discontinue the medication and contact us. Cephalexin Pregnancy And Lactation Text: This medication is Pregnancy Category B and considered safe during pregnancy.  It is also excreted in breast milk but can be used safely for shorter doses. Cimetidine Counseling:  I discussed with the patient the risks of Cimetidine including but not limited to gynecomastia, headache, diarrhea, nausea, drowsiness, arrhythmias, pancreatitis, skin rashes, psychosis, bone marrow suppression and kidney toxicity. Spironolactone Counseling: Patient advised regarding risks of diarrhea, abdominal pain, hyperkalemia, birth defects (for female patients), liver toxicity and renal toxicity. The patient may need blood work to monitor liver and kidney function and potassium levels while on therapy. The patient verbalized understanding of the proper use and possible adverse effects of spironolactone.  All of the patient's questions and concerns were addressed. Cellcept Counseling:  I discussed with the patient the risks of mycophenolate mofetil including but not limited to infection/immunosuppression, GI upset, hypokalemia, hypercholesterolemia, bone marrow suppression, lymphoproliferative disorders, malignancy, GI ulceration/bleed/perforation, colitis, interstitial lung disease, kidney failure, progressive multifocal leukoencephalopathy, and birth defects.  The patient understands that monitoring is required including a baseline creatinine and regular CBC testing. In addition, patient must alert us immediately if symptoms of infection or other concerning signs are noted. Rituxan Counseling:  I discussed with the patient the risks of Rituxan infusions. Side effects can include infusion reactions, severe drug rashes including mucocutaneous reactions, reactivation of latent hepatitis and other infections and rarely progressive multifocal leukoencephalopathy.  All of the patient's questions and concerns were addressed. Solaraze Pregnancy And Lactation Text: This medication is Pregnancy Category B and is considered safe. There is some data to suggest avoiding during the third trimester. It is unknown if this medication is excreted in breast milk. Griseofulvin Counseling:  I discussed with the patient the risks of griseofulvin including but not limited to photosensitivity, cytopenia, liver damage, nausea/vomiting and severe allergy.  The patient understands that this medication is best absorbed when taken with a fatty meal (e.g., ice cream or french fries). Dapsone Pregnancy And Lactation Text: This medication is Pregnancy Category C and is not considered safe during pregnancy or breast feeding. Dupixent Pregnancy And Lactation Text: This medication likely crosses the placenta but the risk for the fetus is uncertain. This medication is excreted in breast milk. Nsaids Pregnancy And Lactation Text: These medications are considered safe up to 30 weeks gestation. It is excreted in breast milk. Tremfya Counseling: I discussed with the patient the risks of guselkumab including but not limited to immunosuppression, serious infections, worsening of inflammatory bowel disease and drug reactions.  The patient understands that monitoring is required including a PPD at baseline and must alert us or the primary physician if symptoms of infection or other concerning signs are noted. Benzoyl Peroxide Counseling: Patient counseled that medicine may cause skin irritation and bleach clothing.  In the event of skin irritation, the patient was advised to reduce the amount of the drug applied or use it less frequently.   The patient verbalized understanding of the proper use and possible adverse effects of benzoyl peroxide.  All of the patient's questions and concerns were addressed. Clindamycin Counseling: I counseled the patient regarding use of clindamycin as an antibiotic for prophylactic and/or therapeutic purposes. Clindamycin is active against numerous classes of bacteria, including skin bacteria. Side effects may include nausea, diarrhea, gastrointestinal upset, rash, hives, yeast infections, and in rare cases, colitis. Imiquimod Counseling:  I discussed with the patient the risks of imiquimod including but not limited to erythema, scaling, itching, weeping, crusting, and pain.  Patient understands that the inflammatory response to imiquimod is variable from person to person and was educated regarded proper titration schedule.  If flu-like symptoms develop, patient knows to discontinue the medication and contact us. Spironolactone Pregnancy And Lactation Text: This medication can cause feminization of the male fetus and should be avoided during pregnancy. The active metabolite is also found in breast milk. Quinolones Counseling:  I discussed with the patient the risks of fluoroquinolones including but not limited to GI upset, allergic reaction, drug rash, diarrhea, dizziness, photosensitivity, yeast infections, liver function test abnormalities, tendonitis/tendon rupture. Griseofulvin Pregnancy And Lactation Text: This medication is Pregnancy Category X and is known to cause serious birth defects. It is unknown if this medication is excreted in breast milk but breast feeding should be avoided. Acitretin Counseling:  I discussed with the patient the risks of acitretin including but not limited to hair loss, dry lips/skin/eyes, liver damage, hyperlipidemia, depression/suicidal ideation, photosensitivity.  Serious rare side effects can include but are not limited to pancreatitis, pseudotumor cerebri, bony changes, clot formation/stroke/heart attack.  Patient understands that alcohol is contraindicated since it can result in liver toxicity and significantly prolong the elimination of the drug by many years. Topical Retinoid counseling:  Patient advised to apply a pea-sized amount only at bedtime and wait 30 minutes after washing their face before applying.  If too drying, patient may add a non-comedogenic moisturizer. The patient verbalized understanding of the proper use and possible adverse effects of retinoids.  All of the patient's questions and concerns were addressed. Rituxan Pregnancy And Lactation Text: This medication is Pregnancy Category C and it isn't know if it is safe during pregnancy. It is unknown if this medication is excreted in breast milk but similar antibodies are known to be excreted. Doxepin Counseling:  Patient advised that the medication is sedating and not to drive a car after taking this medication. Patient informed of potential adverse effects including but not limited to dry mouth, urinary retention, and blurry vision.  The patient verbalized understanding of the proper use and possible adverse effects of doxepin.  All of the patient's questions and concerns were addressed. Clindamycin Pregnancy And Lactation Text: This medication can be used in pregnancy if certain situations. Clindamycin is also present in breast milk. Erivedge Counseling- I discussed with the patient the risks of Erivedge including but not limited to nausea, vomiting, diarrhea, constipation, weight loss, changes in the sense of taste, decreased appetite, muscle spasms, and hair loss.  The patient verbalized understanding of the proper use and possible adverse effects of Erivedge.  All of the patient's questions and concerns were addressed. Detail Level: Simple Siliq Counseling:  I discussed with the patient the risks of Siliq including but not limited to new or worsening depression, suicidal thoughts and behavior, immunosuppression, malignancy, posterior leukoencephalopathy syndrome, and serious infections.  The patient understands that monitoring is required including a PPD at baseline and must alert us or the primary physician if symptoms of infection or other concerning signs are noted. There is also a special program designed to monitor depression which is required with Siliq. Odomzo Counseling- I discussed with the patient the risks of Odomzo including but not limited to nausea, vomiting, diarrhea, constipation, weight loss, changes in the sense of taste, decreased appetite, muscle spasms, and hair loss.  The patient verbalized understanding of the proper use and possible adverse effects of Odomzo.  All of the patient's questions and concerns were addressed. Enbrel Counseling:  I discussed with the patient the risks of etanercept including but not limited to myelosuppression, immunosuppression, autoimmune hepatitis, demyelinating diseases, lymphoma, and infections.  The patient understands that monitoring is required including a PPD at baseline and must alert us or the primary physician if symptoms of infection or other concerning signs are noted. Cyclophosphamide Counseling:  I discussed with the patient the risks of cyclophosphamide including but not limited to hair loss, hormonal abnormalities, decreased fertility, abdominal pain, diarrhea, nausea and vomiting, bone marrow suppression and infection. The patient understands that monitoring is required while taking this medication. Itraconazole Counseling:  I discussed with the patient the risks of itraconazole including but not limited to liver damage, nausea/vomiting, neuropathy, and severe allergy.  The patient understands that this medication is best absorbed when taken with acidic beverages such as non-diet cola or ginger ale.  The patient understands that monitoring is required including baseline LFTs and repeat LFTs at intervals.  The patient understands that they are to contact us or the primary physician if concerning signs are noted. Eucrisa Counseling: Patient may experience a mild burning sensation during topical application. Eucrisa is not approved in children less than 2 years of age. SSKI Counseling:  I discussed with the patient the risks of SSKI including but not limited to thyroid abnormalities, metallic taste, GI upset, fever, headache, acne, arthralgias, paraesthesias, lymphadenopathy, easy bleeding, arrhythmias, and allergic reaction. Benzoyl Peroxide Pregnancy And Lactation Text: This medication is Pregnancy Category C. It is unknown if benzoyl peroxide is excreted in breast milk.

## 2022-01-03 ENCOUNTER — APPOINTMENT (RX ONLY)
Dept: URBAN - METROPOLITAN AREA CLINIC 56 | Facility: CLINIC | Age: 87
Setting detail: DERMATOLOGY
End: 2022-01-03

## 2022-01-03 DIAGNOSIS — Z85.828 PERSONAL HISTORY OF OTHER MALIGNANT NEOPLASM OF SKIN: ICD-10-CM

## 2022-01-03 DIAGNOSIS — L81.4 OTHER MELANIN HYPERPIGMENTATION: ICD-10-CM

## 2022-01-03 DIAGNOSIS — Z12.83 ENCOUNTER FOR SCREENING FOR MALIGNANT NEOPLASM OF SKIN: ICD-10-CM

## 2022-01-03 DIAGNOSIS — D18.0 HEMANGIOMA: ICD-10-CM

## 2022-01-03 DIAGNOSIS — D22 MELANOCYTIC NEVI: ICD-10-CM

## 2022-01-03 DIAGNOSIS — L82.1 OTHER SEBORRHEIC KERATOSIS: ICD-10-CM

## 2022-01-03 DIAGNOSIS — L57.0 ACTINIC KERATOSIS: ICD-10-CM

## 2022-01-03 PROBLEM — D22.5 MELANOCYTIC NEVI OF TRUNK: Status: ACTIVE | Noted: 2022-01-03

## 2022-01-03 PROBLEM — D18.01 HEMANGIOMA OF SKIN AND SUBCUTANEOUS TISSUE: Status: ACTIVE | Noted: 2022-01-03

## 2022-01-03 PROCEDURE — ? COUNSELING

## 2022-01-03 PROCEDURE — 99213 OFFICE O/P EST LOW 20 MIN: CPT | Mod: 25

## 2022-01-03 PROCEDURE — ? LIQUID NITROGEN

## 2022-01-03 PROCEDURE — ? SUNSCREEN RECOMMENDATIONS

## 2022-01-03 PROCEDURE — ? ADDITIONAL NOTES

## 2022-01-03 PROCEDURE — 17000 DESTRUCT PREMALG LESION: CPT

## 2022-01-03 PROCEDURE — 17003 DESTRUCT PREMALG LES 2-14: CPT

## 2022-01-03 ASSESSMENT — LOCATION DETAILED DESCRIPTION DERM
LOCATION DETAILED: LEFT SUPERIOR CENTRAL MALAR CHEEK
LOCATION DETAILED: EPIGASTRIC SKIN
LOCATION DETAILED: RIGHT MEDIAL UPPER BACK
LOCATION DETAILED: PERIUMBILICAL SKIN
LOCATION DETAILED: LEFT LATERAL SUPERIOR CHEST
LOCATION DETAILED: LEFT SUPERIOR LATERAL MALAR CHEEK
LOCATION DETAILED: LEFT MEDIAL UPPER BACK
LOCATION DETAILED: LEFT INFERIOR LATERAL MALAR CHEEK
LOCATION DETAILED: LEFT INFERIOR MEDIAL UPPER BACK

## 2022-01-03 ASSESSMENT — LOCATION SIMPLE DESCRIPTION DERM
LOCATION SIMPLE: ABDOMEN
LOCATION SIMPLE: LEFT CHEEK
LOCATION SIMPLE: RIGHT UPPER BACK
LOCATION SIMPLE: LEFT UPPER BACK
LOCATION SIMPLE: CHEST

## 2022-01-03 ASSESSMENT — LOCATION ZONE DERM
LOCATION ZONE: TRUNK
LOCATION ZONE: FACE

## 2022-01-03 NOTE — PROCEDURE: LIQUID NITROGEN
Consent: The patient's consent was obtained including but not limited to risks of crusting, scabbing, blistering, scarring, darker or lighter pigmentary change, recurrence, incomplete removal and infection.
Show Aperture Variable?: Yes
Duration Of Freeze Thaw-Cycle (Seconds): 0
Post-Care Instructions: I reviewed with the patient in detail post-care instructions. Patient is to wear sunprotection, and avoid picking at any of the treated lesions. Pt may apply Vaseline to crusted or scabbing areas.
Detail Level: Detailed
Render Post-Care Instructions In Note?: no

## 2022-07-11 ENCOUNTER — APPOINTMENT (RX ONLY)
Dept: URBAN - METROPOLITAN AREA CLINIC 56 | Facility: CLINIC | Age: 87
Setting detail: DERMATOLOGY
End: 2022-07-11

## 2022-07-11 DIAGNOSIS — L23.9 ALLERGIC CONTACT DERMATITIS, UNSPECIFIED CAUSE: ICD-10-CM | Status: INADEQUATELY CONTROLLED

## 2022-07-11 DIAGNOSIS — D18.0 HEMANGIOMA: ICD-10-CM

## 2022-07-11 DIAGNOSIS — L57.8 OTHER SKIN CHANGES DUE TO CHRONIC EXPOSURE TO NONIONIZING RADIATION: ICD-10-CM

## 2022-07-11 DIAGNOSIS — L82.1 OTHER SEBORRHEIC KERATOSIS: ICD-10-CM

## 2022-07-11 DIAGNOSIS — D22 MELANOCYTIC NEVI: ICD-10-CM

## 2022-07-11 DIAGNOSIS — L81.4 OTHER MELANIN HYPERPIGMENTATION: ICD-10-CM

## 2022-07-11 DIAGNOSIS — Z85.828 PERSONAL HISTORY OF OTHER MALIGNANT NEOPLASM OF SKIN: ICD-10-CM

## 2022-07-11 PROBLEM — D22.62 MELANOCYTIC NEVI OF LEFT UPPER LIMB, INCLUDING SHOULDER: Status: ACTIVE | Noted: 2022-07-11

## 2022-07-11 PROBLEM — L30.9 DERMATITIS, UNSPECIFIED: Status: ACTIVE | Noted: 2022-07-11

## 2022-07-11 PROBLEM — D18.01 HEMANGIOMA OF SKIN AND SUBCUTANEOUS TISSUE: Status: ACTIVE | Noted: 2022-07-11

## 2022-07-11 PROCEDURE — 99214 OFFICE O/P EST MOD 30 MIN: CPT

## 2022-07-11 PROCEDURE — ? ADDITIONAL NOTES

## 2022-07-11 PROCEDURE — ? TREATMENT REGIMEN

## 2022-07-11 PROCEDURE — ? SUNSCREEN RECOMMENDATIONS

## 2022-07-11 PROCEDURE — ? COUNSELING

## 2022-07-11 PROCEDURE — ? PRESCRIPTION

## 2022-07-11 RX ORDER — TRIAMCINOLONE ACETONIDE 1 MG/G
1 CREAM TOPICAL
Qty: 454 | Refills: 2 | Status: ERX | COMMUNITY
Start: 2022-07-11

## 2022-07-11 RX ADMIN — TRIAMCINOLONE ACETONIDE 1: 1 CREAM TOPICAL at 00:00

## 2022-07-11 ASSESSMENT — LOCATION ZONE DERM
LOCATION ZONE: ARM
LOCATION ZONE: TRUNK

## 2022-07-11 ASSESSMENT — LOCATION SIMPLE DESCRIPTION DERM
LOCATION SIMPLE: LEFT UPPER ARM
LOCATION SIMPLE: RIGHT UPPER ARM
LOCATION SIMPLE: CHEST
LOCATION SIMPLE: ABDOMEN
LOCATION SIMPLE: RIGHT UPPER BACK

## 2022-07-11 ASSESSMENT — LOCATION DETAILED DESCRIPTION DERM
LOCATION DETAILED: LEFT ANTECUBITAL SKIN
LOCATION DETAILED: RIGHT LATERAL SUPERIOR CHEST
LOCATION DETAILED: RIGHT LATERAL ABDOMEN
LOCATION DETAILED: RIGHT ANTERIOR PROXIMAL UPPER ARM
LOCATION DETAILED: RIGHT MEDIAL UPPER BACK
LOCATION DETAILED: RIGHT ANTERIOR DISTAL UPPER ARM

## 2022-07-11 ASSESSMENT — ITCH NUMERIC RATING SCALE: HOW SEVERE IS YOUR ITCHING?: 9

## 2022-07-11 ASSESSMENT — SEVERITY ASSESSMENT 2020: SEVERITY 2020: MODERATE

## 2022-07-11 NOTE — PROCEDURE: ADDITIONAL NOTES
Additional Notes: Patient consent was obtained to proceed with the visit and recommended plan of care after discussion of all risks and benefits, including the risks of COVID-19 exposure.
Detail Level: Simple
Additional Notes: If rash dose not resolve recommend patient returning to clinic for biopsy.
Render Risk Assessment In Note?: no

## 2022-07-25 ENCOUNTER — APPOINTMENT (RX ONLY)
Dept: URBAN - METROPOLITAN AREA CLINIC 56 | Facility: CLINIC | Age: 87
Setting detail: DERMATOLOGY
End: 2022-07-25

## 2022-07-25 DIAGNOSIS — L29.89 OTHER PRURITUS: ICD-10-CM | Status: INADEQUATELY CONTROLLED

## 2022-07-25 DIAGNOSIS — L23.9 ALLERGIC CONTACT DERMATITIS, UNSPECIFIED CAUSE: ICD-10-CM

## 2022-07-25 DIAGNOSIS — L29.8 OTHER PRURITUS: ICD-10-CM | Status: INADEQUATELY CONTROLLED

## 2022-07-25 PROBLEM — L30.9 DERMATITIS, UNSPECIFIED: Status: ACTIVE | Noted: 2022-07-25

## 2022-07-25 PROCEDURE — ? COUNSELING

## 2022-07-25 PROCEDURE — 11104 PUNCH BX SKIN SINGLE LESION: CPT

## 2022-07-25 PROCEDURE — ? PRESCRIPTION

## 2022-07-25 PROCEDURE — ? BIOPSY BY PUNCH METHOD

## 2022-07-25 PROCEDURE — 99214 OFFICE O/P EST MOD 30 MIN: CPT | Mod: 25

## 2022-07-25 PROCEDURE — ? PRESCRIPTION MEDICATION MANAGEMENT

## 2022-07-25 RX ORDER — CETIRIZINE HCL 10 MG
1 CAPSULE ORAL QHS
Qty: 30 | Refills: 2 | Status: ERX | COMMUNITY
Start: 2022-07-25

## 2022-07-25 RX ORDER — CLOBETASOL PROPIONATE 0.5 MG/G
1 CREAM TOPICAL
Qty: 60 | Refills: 3 | Status: ERX | COMMUNITY
Start: 2022-07-25

## 2022-07-25 RX ORDER — FEXOFENADINE HYDROCHLORIDE 180 MG/1
1 TABLET ORAL QAM
Qty: 30 | Refills: 2 | Status: ERX | COMMUNITY
Start: 2022-07-25

## 2022-07-25 RX ORDER — PREDNISONE 10 MG/1
1 TABLET ORAL QD
Qty: 40 | Refills: 0 | Status: ERX | COMMUNITY
Start: 2022-07-25

## 2022-07-25 RX ADMIN — PREDNISONE 1: 10 TABLET ORAL at 00:00

## 2022-07-25 RX ADMIN — CLOBETASOL PROPIONATE 1: 0.5 CREAM TOPICAL at 00:00

## 2022-07-25 RX ADMIN — Medication 1: at 00:00

## 2022-07-25 RX ADMIN — FEXOFENADINE HYDROCHLORIDE 1: 180 TABLET ORAL at 00:00

## 2022-07-25 ASSESSMENT — LOCATION ZONE DERM
LOCATION ZONE: TRUNK
LOCATION ZONE: AXILLAE

## 2022-07-25 ASSESSMENT — LOCATION SIMPLE DESCRIPTION DERM
LOCATION SIMPLE: RIGHT UPPER BACK
LOCATION SIMPLE: LEFT AXILLARY VAULT
LOCATION SIMPLE: LOWER BACK
LOCATION SIMPLE: LEFT UPPER BACK
LOCATION SIMPLE: CHEST
LOCATION SIMPLE: RIGHT AXILLARY VAULT

## 2022-07-25 ASSESSMENT — LOCATION DETAILED DESCRIPTION DERM
LOCATION DETAILED: RIGHT AXILLARY VAULT
LOCATION DETAILED: RIGHT MEDIAL SUPERIOR CHEST
LOCATION DETAILED: UPPER STERNUM
LOCATION DETAILED: RIGHT LATERAL SUPERIOR CHEST
LOCATION DETAILED: RIGHT MEDIAL UPPER BACK
LOCATION DETAILED: LEFT SUPERIOR MEDIAL UPPER BACK
LOCATION DETAILED: INFERIOR LUMBAR SPINE
LOCATION DETAILED: LEFT AXILLARY VAULT

## 2022-07-25 NOTE — PROCEDURE: PRESCRIPTION MEDICATION MANAGEMENT
Render In Strict Bullet Format?: No
Detail Level: Zone
Plan: Prednisone 40mg taper over 16 days \\nD/C Triamcinolone 0.1% cream \\nStart Clobetasol 0.05% cream BID x 2 weeks \\nFexofenadine 180mg QAM \\nCetirizine 10mg QHS

## 2022-08-15 ENCOUNTER — APPOINTMENT (RX ONLY)
Dept: URBAN - METROPOLITAN AREA CLINIC 56 | Facility: CLINIC | Age: 87
Setting detail: DERMATOLOGY
End: 2022-08-15

## 2022-08-15 DIAGNOSIS — L259 CONTACT DERMATITIS AND OTHER ECZEMA, UNSPECIFIED CAUSE: ICD-10-CM | Status: WELL CONTROLLED

## 2022-08-15 PROBLEM — L30.8 OTHER SPECIFIED DERMATITIS: Status: ACTIVE | Noted: 2022-08-15

## 2022-08-15 PROCEDURE — ? PRESCRIPTION MEDICATION MANAGEMENT

## 2022-08-15 PROCEDURE — ? COUNSELING

## 2022-08-15 PROCEDURE — 99213 OFFICE O/P EST LOW 20 MIN: CPT

## 2022-08-15 PROCEDURE — ? ADDITIONAL NOTES

## 2022-08-15 ASSESSMENT — LOCATION SIMPLE DESCRIPTION DERM
LOCATION SIMPLE: RIGHT UPPER BACK
LOCATION SIMPLE: CHEST

## 2022-08-15 ASSESSMENT — LOCATION DETAILED DESCRIPTION DERM
LOCATION DETAILED: LEFT MEDIAL SUPERIOR CHEST
LOCATION DETAILED: RIGHT LATERAL SUPERIOR CHEST
LOCATION DETAILED: RIGHT SUPERIOR MEDIAL UPPER BACK

## 2022-08-15 ASSESSMENT — LOCATION ZONE DERM: LOCATION ZONE: TRUNK

## 2022-08-15 NOTE — PROCEDURE: PRESCRIPTION MEDICATION MANAGEMENT
Continue Regimen: TAC 0.1% cream on the body BID X 2 weeks/month when itching.\\nFexofenadine QAM and Cetirizine tablets QHS
Render In Strict Bullet Format?: No
Detail Level: Zone

## 2022-09-08 NOTE — PROCEDURE: REPAIR NOTE
Spoke with [facility].  Pt will need cleaning done soon and they are requesting a clearance.  Advised to fax information and will update when able.    Cheiloplasty (Complex) Text: A decision was made to reconstruct the defect with a  cheiloplasty.  The defect was undermined extensively.  Additional obicularis oris muscle was excised with a 15 blade scalpel.  The defect was converted into a full thickness wedge to facilite a better cosmetic result.  Small vessels were then tied off with 5-0 monocyrl. The obicularis oris, superficial fascia, adipose and dermis were then reapproximated.  After the deeper layers were approximated the epidermis was reapproximated with particular care given to realign the vermilion border.

## 2022-10-13 ENCOUNTER — APPOINTMENT (RX ONLY)
Dept: URBAN - METROPOLITAN AREA CLINIC 56 | Facility: CLINIC | Age: 87
Setting detail: DERMATOLOGY
End: 2022-10-13

## 2022-10-13 DIAGNOSIS — L259 CONTACT DERMATITIS AND OTHER ECZEMA, UNSPECIFIED CAUSE: ICD-10-CM | Status: INADEQUATELY CONTROLLED

## 2022-10-13 PROBLEM — L30.8 OTHER SPECIFIED DERMATITIS: Status: ACTIVE | Noted: 2022-10-13

## 2022-10-13 PROCEDURE — ? PRESCRIPTION

## 2022-10-13 PROCEDURE — 99214 OFFICE O/P EST MOD 30 MIN: CPT

## 2022-10-13 PROCEDURE — ? PRESCRIPTION MEDICATION MANAGEMENT

## 2022-10-13 PROCEDURE — ? COUNSELING

## 2022-10-13 RX ORDER — CETIRIZINE HYDROCHLORIDE 10 MG/1
1 TABLET ORAL QHS
Qty: 30 | Refills: 2 | Status: ERX | COMMUNITY
Start: 2022-10-13

## 2022-10-13 RX ORDER — PREDNISONE 20 MG/1
1 TABLET ORAL TAKE AS DIRECTED
Qty: 25 | Refills: 0 | Status: ERX | COMMUNITY
Start: 2022-10-13

## 2022-10-13 RX ADMIN — PREDNISONE 1: 20 TABLET ORAL at 00:00

## 2022-10-13 RX ADMIN — CETIRIZINE HYDROCHLORIDE 1: 10 TABLET ORAL at 00:00

## 2022-10-13 ASSESSMENT — LOCATION SIMPLE DESCRIPTION DERM
LOCATION SIMPLE: CHEST
LOCATION SIMPLE: RIGHT UPPER BACK

## 2022-10-13 ASSESSMENT — LOCATION ZONE DERM: LOCATION ZONE: TRUNK

## 2022-10-13 ASSESSMENT — LOCATION DETAILED DESCRIPTION DERM
LOCATION DETAILED: RIGHT LATERAL SUPERIOR CHEST
LOCATION DETAILED: LEFT MEDIAL SUPERIOR CHEST
LOCATION DETAILED: RIGHT SUPERIOR MEDIAL UPPER BACK

## 2022-12-05 ENCOUNTER — APPOINTMENT (RX ONLY)
Dept: URBAN - METROPOLITAN AREA CLINIC 56 | Facility: CLINIC | Age: 87
Setting detail: DERMATOLOGY
End: 2022-12-05

## 2022-12-05 DIAGNOSIS — L20.89 OTHER ATOPIC DERMATITIS: ICD-10-CM | Status: INADEQUATELY CONTROLLED

## 2022-12-05 PROCEDURE — ? TREATMENT REGIMEN

## 2022-12-05 PROCEDURE — ? COUNSELING

## 2022-12-05 PROCEDURE — ? ADDITIONAL NOTES

## 2022-12-05 PROCEDURE — 99214 OFFICE O/P EST MOD 30 MIN: CPT

## 2022-12-05 PROCEDURE — ? DUPIXENT INITIATION

## 2022-12-05 ASSESSMENT — LOCATION ZONE DERM
LOCATION ZONE: ARM
LOCATION ZONE: TRUNK

## 2022-12-05 ASSESSMENT — LOCATION SIMPLE DESCRIPTION DERM
LOCATION SIMPLE: LEFT UPPER ARM
LOCATION SIMPLE: RIGHT UPPER ARM
LOCATION SIMPLE: ABDOMEN
LOCATION SIMPLE: UPPER BACK

## 2022-12-05 ASSESSMENT — LOCATION DETAILED DESCRIPTION DERM
LOCATION DETAILED: INFERIOR THORACIC SPINE
LOCATION DETAILED: LEFT ANTERIOR PROXIMAL UPPER ARM
LOCATION DETAILED: RIGHT ANTERIOR DISTAL UPPER ARM
LOCATION DETAILED: PERIUMBILICAL SKIN

## 2022-12-05 ASSESSMENT — BSA ECZEMA: % BODY COVERED IN ECZEMA: 40

## 2022-12-05 ASSESSMENT — ITCH NUMERIC RATING SCALE: HOW SEVERE IS YOUR ITCHING?: 10

## 2022-12-05 NOTE — PROCEDURE: DUPIXENT INITIATION
Detail Level: Zone
Is Cyclosporine Contraindicated?: No
Diagnosis (Required): Atopic Dermatitis/Eczematous Dermatitis
Pregnancy And Lactation Warning Text: There have not been adverse fetal risks in women taking Dupixent while pregnant. It is unknown if this medication is excreted in breast milk.
Dupixent Monitoring Guidelines: There is no laboratory monitoring requirement with Dupixent.
Dupixent Dosing: 600 mg SC day 0 then 300 mg SC every other week

## 2022-12-05 NOTE — PROCEDURE: TREATMENT REGIMEN
Continue Regimen: TAC 0.1% ointment on the affected areas of the body BID X 2 weeks, 2 weeks off, repeat as needed.\\nCetirizine tablets 1 PO QAM, hydroxyzine tablets 1 PO QHS.
Detail Level: Zone

## 2023-01-23 NOTE — PROCEDURE: REPAIR NOTE
Quality 130: Documentation Of Current Medications In The Medical Record: Current Medications Documented Detail Level: Detailed Quality 110: Preventive Care And Screening: Influenza Immunization: Influenza Immunization Administered during Influenza season Helical Rim Advancement Flap Text: The defect edges were debeveled with a #15 blade scalpel.  Given the location of the defect and the proximity to free margins (helical rim) a double helical rim advancement flap was deemed most appropriate.  Using a sterile surgical marker, the appropriate advancement flaps were drawn incorporating the defect and placing the expected incisions between the helical rim and antihelix where possible.  The area thus outlined was incised through and through with a #15 scalpel blade.  With a skin hook and iris scissors, the flaps were gently and sharply undermined and freed up.

## 2023-02-02 ENCOUNTER — APPOINTMENT (RX ONLY)
Dept: URBAN - METROPOLITAN AREA CLINIC 56 | Facility: CLINIC | Age: 88
Setting detail: DERMATOLOGY
End: 2023-02-02

## 2023-02-02 DIAGNOSIS — D18.0 HEMANGIOMA: ICD-10-CM

## 2023-02-02 DIAGNOSIS — D22 MELANOCYTIC NEVI: ICD-10-CM

## 2023-02-02 DIAGNOSIS — L57.8 OTHER SKIN CHANGES DUE TO CHRONIC EXPOSURE TO NONIONIZING RADIATION: ICD-10-CM

## 2023-02-02 DIAGNOSIS — Z85.828 PERSONAL HISTORY OF OTHER MALIGNANT NEOPLASM OF SKIN: ICD-10-CM

## 2023-02-02 DIAGNOSIS — L81.4 OTHER MELANIN HYPERPIGMENTATION: ICD-10-CM

## 2023-02-02 DIAGNOSIS — L20.89 OTHER ATOPIC DERMATITIS: ICD-10-CM | Status: INADEQUATELY CONTROLLED

## 2023-02-02 DIAGNOSIS — L82.1 OTHER SEBORRHEIC KERATOSIS: ICD-10-CM

## 2023-02-02 DIAGNOSIS — L57.0 ACTINIC KERATOSIS: ICD-10-CM

## 2023-02-02 PROBLEM — D18.01 HEMANGIOMA OF SKIN AND SUBCUTANEOUS TISSUE: Status: ACTIVE | Noted: 2023-02-02

## 2023-02-02 PROBLEM — D22.62 MELANOCYTIC NEVI OF LEFT UPPER LIMB, INCLUDING SHOULDER: Status: ACTIVE | Noted: 2023-02-02

## 2023-02-02 PROBLEM — D48.5 NEOPLASM OF UNCERTAIN BEHAVIOR OF SKIN: Status: ACTIVE | Noted: 2023-02-02

## 2023-02-02 PROCEDURE — ? SUNSCREEN RECOMMENDATIONS

## 2023-02-02 PROCEDURE — ? ADDITIONAL NOTES

## 2023-02-02 PROCEDURE — 99214 OFFICE O/P EST MOD 30 MIN: CPT | Mod: 25

## 2023-02-02 PROCEDURE — ? COUNSELING

## 2023-02-02 PROCEDURE — ? BIOPSY BY SHAVE METHOD

## 2023-02-02 PROCEDURE — ? PRESCRIPTION

## 2023-02-02 PROCEDURE — 17000 DESTRUCT PREMALG LESION: CPT | Mod: 59

## 2023-02-02 PROCEDURE — 11102 TANGNTL BX SKIN SINGLE LES: CPT

## 2023-02-02 PROCEDURE — ? TREATMENT REGIMEN

## 2023-02-02 PROCEDURE — ? LIQUID NITROGEN

## 2023-02-02 PROCEDURE — ? PRESCRIPTION MEDICATION MANAGEMENT

## 2023-02-02 ASSESSMENT — LOCATION SIMPLE DESCRIPTION DERM
LOCATION SIMPLE: CHEST
LOCATION SIMPLE: UPPER BACK
LOCATION SIMPLE: ABDOMEN
LOCATION SIMPLE: NOSE
LOCATION SIMPLE: RIGHT UPPER ARM
LOCATION SIMPLE: LEFT UPPER ARM

## 2023-02-02 ASSESSMENT — LOCATION DETAILED DESCRIPTION DERM
LOCATION DETAILED: RIGHT LATERAL SUPERIOR CHEST
LOCATION DETAILED: RIGHT ANTERIOR DISTAL UPPER ARM
LOCATION DETAILED: SUPERIOR THORACIC SPINE
LOCATION DETAILED: NASAL SUPRATIP
LOCATION DETAILED: LEFT ANTECUBITAL SKIN
LOCATION DETAILED: RIGHT ANTERIOR PROXIMAL UPPER ARM
LOCATION DETAILED: RIGHT LATERAL ABDOMEN

## 2023-02-02 ASSESSMENT — LOCATION ZONE DERM
LOCATION ZONE: NOSE
LOCATION ZONE: ARM
LOCATION ZONE: TRUNK

## 2023-02-02 ASSESSMENT — SEVERITY ASSESSMENT 2020: SEVERITY 2020: MODERATE

## 2023-02-02 ASSESSMENT — ITCH NUMERIC RATING SCALE: HOW SEVERE IS YOUR ITCHING?: 8

## 2023-02-02 ASSESSMENT — BSA ECZEMA: % BODY COVERED IN ECZEMA: 22

## 2023-02-02 NOTE — PROCEDURE: ADDITIONAL NOTES
Additional Notes: Patient consent was obtained to proceed with the visit and recommended plan of care after discussion of all risks and benefits, including the risks of COVID-19 exposure.
Detail Level: Simple
Render Risk Assessment In Note?: no
Additional Notes: Approved for Dupixent, pt needs to submit information for patient assistance. Daughter will apply for patient assistance

## 2023-02-02 NOTE — PROCEDURE: PRESCRIPTION MEDICATION MANAGEMENT
Render In Strict Bullet Format?: No
Detail Level: Zone
Initiate Treatment: Elidel 1% cream QD
Continue Regimen: triamcinolone bid x 2 weeks, one week off repeat prn\\nCetirizine 10mg QD\\nFeoxfenadine 180mg QD

## 2023-02-02 NOTE — PROCEDURE: LIQUID NITROGEN
Render Note In Bullet Format When Appropriate: No
Post-Care Instructions: I reviewed with the patient in detail post-care instructions. Patient is to wear sunprotection, and avoid picking at any of the treated lesions. Pt may apply Vaseline to crusted or scabbing areas.
Consent: The patient's consent was obtained including but not limited to risks of crusting, scabbing, blistering, scarring, darker or lighter pigmentary change, recurrence, incomplete removal and infection.
Detail Level: Detailed
Show Applicator Variable?: Yes
Duration Of Freeze Thaw-Cycle (Seconds): 0

## 2023-02-03 RX ORDER — PIMECROLIMUS 10 MG/G
1 CREAM TOPICAL QD
Qty: 100 | Refills: 6 | Status: ERX | COMMUNITY
Start: 2023-02-03

## 2023-02-03 RX ADMIN — PIMECROLIMUS 1: 10 CREAM TOPICAL at 00:00

## 2023-03-07 ENCOUNTER — RX ONLY (OUTPATIENT)
Age: 88
Setting detail: RX ONLY
End: 2023-03-07

## 2023-03-07 RX ORDER — DUPILUMAB 300 MG/2ML
1 INJECTION, SOLUTION SUBCUTANEOUS AS DIRECTED
Qty: 2 | Refills: 11 | Status: ERX | COMMUNITY
Start: 2023-03-07

## 2023-03-10 ENCOUNTER — RX ONLY (OUTPATIENT)
Age: 88
Setting detail: RX ONLY
End: 2023-03-10

## 2023-03-10 RX ORDER — DUPILUMAB 300 MG/2ML
1 INJECTION, SOLUTION SUBCUTANEOUS AS DIRECTED
Qty: 2 | Refills: 11 | Status: ERX

## 2023-03-16 ENCOUNTER — APPOINTMENT (RX ONLY)
Dept: URBAN - METROPOLITAN AREA CLINIC 56 | Facility: CLINIC | Age: 88
Setting detail: DERMATOLOGY
End: 2023-03-16

## 2023-03-16 PROBLEM — C44.41 BASAL CELL CARCINOMA OF SKIN OF SCALP AND NECK: Status: ACTIVE | Noted: 2023-03-16

## 2023-03-16 PROCEDURE — 13132 CMPLX RPR F/C/C/M/N/AX/G/H/F: CPT

## 2023-03-16 PROCEDURE — ? EXCISION

## 2023-03-16 PROCEDURE — 11622 EXC S/N/H/F/G MAL+MRG 1.1-2: CPT

## 2023-03-16 NOTE — PROCEDURE: EXCISION
IV intact/Arm band on Complex Repair And Tissue Cultured Epidermal Autograft Text: The defect edges were debeveled with a #15 scalpel blade.  The primary defect was closed partially with a complex linear closure.  Given the location of the defect, shape of the defect and the proximity to free margins an tissue cultured epidermal autograft was deemed most appropriate to repair the remaining defect.  The graft was trimmed to fit the size of the remaining defect.  The graft was then placed in the primary defect, oriented appropriately, and sutured into place.

## 2023-03-16 NOTE — PROCEDURE: EXCISION
Labs received from [address] [place] [address]    One is INR    Collected 12/27/18       See 12 page lab results fax Post-Care Instructions: I reviewed with the patient in detail post-care instructions. \\nPatient is not to engage in any heavy lifting, exercise, or swimming for the next 14 days. Should the patient develop any fevers, chills, bleeding, severe pain patient will contact the office immediately.

## 2023-04-21 NOTE — PROCEDURE: EXCISION
61 Fusiform Excision Additional Text (Leave Blank If You Do Not Want): The margin was drawn around the clinically apparent lesion.  A fusiform shape was then drawn on the skin incorporating the lesion and margins.  Incisions were then made along these lines to the appropriate tissue plane and the lesion was extirpated.

## 2023-06-13 NOTE — PROCEDURE: MOHS SURGERY
----- Message from Alexus Ngo sent at 6/13/2023 10:08 AM CDT -----  Regarding: MISSED CALL  Contact: self [phone] mobile  Pt stated she had a missed call from staff Marichuy Lozada regarding her upcoming appointment, ask for a call back        Contact info  [phone] mobile       Nasal Turnover Hinge Flap Text: The defect edges were debeveled with a #15 scalpel blade.  Given the size, depth, location of the defect and the defect being full thickness a nasal turnover hinge flap was deemed most appropriate.  Using a sterile surgical marker, an appropriate hinge flap was drawn incorporating the defect. The area thus outlined was incised with a #15 scalpel blade. The flap was designed to recreate the nasal mucosal lining and the alar rim. The skin margins were undermined to an appropriate distance in all directions utilizing iris scissors.

## 2023-09-06 ENCOUNTER — APPOINTMENT (RX ONLY)
Dept: URBAN - METROPOLITAN AREA CLINIC 56 | Facility: CLINIC | Age: 88
Setting detail: DERMATOLOGY
End: 2023-09-06

## 2023-09-06 DIAGNOSIS — L20.89 OTHER ATOPIC DERMATITIS: ICD-10-CM | Status: INADEQUATELY CONTROLLED

## 2023-09-06 PROCEDURE — ? PRESCRIPTION

## 2023-09-06 PROCEDURE — ? COUNSELING

## 2023-09-06 PROCEDURE — ? TREATMENT REGIMEN

## 2023-09-06 PROCEDURE — 99214 OFFICE O/P EST MOD 30 MIN: CPT

## 2023-09-06 PROCEDURE — ? ADDITIONAL NOTES

## 2023-09-06 RX ORDER — BETAMETHASONE DIPROPIONATE 0.5 MG/G
1 CREAM, AUGMENTED TOPICAL BID
Qty: 50 | Refills: 2 | Status: ERX | COMMUNITY
Start: 2023-09-06

## 2023-09-06 RX ADMIN — BETAMETHASONE DIPROPIONATE 1: 0.5 CREAM, AUGMENTED TOPICAL at 00:00

## 2023-09-06 ASSESSMENT — LOCATION SIMPLE DESCRIPTION DERM: LOCATION SIMPLE: UPPER BACK

## 2023-09-06 ASSESSMENT — ITCH NUMERIC RATING SCALE: HOW SEVERE IS YOUR ITCHING?: 9

## 2023-09-06 ASSESSMENT — LOCATION ZONE DERM: LOCATION ZONE: TRUNK

## 2023-09-06 ASSESSMENT — LOCATION DETAILED DESCRIPTION DERM: LOCATION DETAILED: SUPERIOR THORACIC SPINE

## 2023-09-06 ASSESSMENT — BSA ECZEMA: % BODY COVERED IN ECZEMA: 25

## 2023-09-06 ASSESSMENT — SEVERITY ASSESSMENT 2020: SEVERITY 2020: MODERATE

## 2023-09-06 NOTE — PROCEDURE: TREATMENT REGIMEN
Plan: Patient only injected Dupixent 3 times because her itch resolved and discontinued Rx \\nAdvised her it is a chronic condition and to continue Dupixent. Will send Rx to [facility]\\nApproved through Dupixent [employer] until end of year
Detail Level: Zone

## 2023-11-08 ENCOUNTER — APPOINTMENT (RX ONLY)
Dept: URBAN - METROPOLITAN AREA CLINIC 56 | Facility: CLINIC | Age: 88
Setting detail: DERMATOLOGY
End: 2023-11-08

## 2023-11-08 DIAGNOSIS — L20.89 OTHER ATOPIC DERMATITIS: ICD-10-CM | Status: WELL CONTROLLED

## 2023-11-08 PROCEDURE — 99214 OFFICE O/P EST MOD 30 MIN: CPT

## 2023-11-08 PROCEDURE — ? COUNSELING

## 2023-11-08 PROCEDURE — ? DUPIXENT MONITORING

## 2023-11-08 ASSESSMENT — LOCATION ZONE DERM: LOCATION ZONE: TRUNK

## 2023-11-08 ASSESSMENT — LOCATION SIMPLE DESCRIPTION DERM: LOCATION SIMPLE: UPPER BACK

## 2023-11-08 ASSESSMENT — SEVERITY ASSESSMENT 2020: SEVERITY 2020: MILD

## 2023-11-08 ASSESSMENT — ITCH NUMERIC RATING SCALE: HOW SEVERE IS YOUR ITCHING?: 1

## 2023-11-08 ASSESSMENT — LOCATION DETAILED DESCRIPTION DERM: LOCATION DETAILED: SUPERIOR THORACIC SPINE

## 2023-11-08 ASSESSMENT — BSA ECZEMA: % BODY COVERED IN ECZEMA: 3

## 2024-05-08 ENCOUNTER — APPOINTMENT (RX ONLY)
Dept: URBAN - METROPOLITAN AREA CLINIC 56 | Facility: CLINIC | Age: 89
Setting detail: DERMATOLOGY
End: 2024-05-08

## 2024-05-08 DIAGNOSIS — L20.89 OTHER ATOPIC DERMATITIS: ICD-10-CM | Status: WELL CONTROLLED

## 2024-05-08 PROCEDURE — ? COUNSELING

## 2024-05-08 PROCEDURE — ? DUPIXENT MONITORING

## 2024-05-08 PROCEDURE — 99213 OFFICE O/P EST LOW 20 MIN: CPT

## 2024-05-08 PROCEDURE — ? PRESCRIPTION MEDICATION MANAGEMENT

## 2024-05-08 ASSESSMENT — ITCH NUMERIC RATING SCALE: HOW SEVERE IS YOUR ITCHING?: 0

## 2024-05-08 ASSESSMENT — SEVERITY ASSESSMENT 2020: SEVERITY 2020: CLEAR

## 2024-05-08 NOTE — PROCEDURE: PRESCRIPTION MEDICATION MANAGEMENT
Render In Strict Bullet Format?: No
Plan: Patient denies any adverse side effects or site injection infections. Patient is approved through patient assistance until 12/2024. Patient is moving to [place] next week. Recommended patient update pharmacy of address with [facility] pharmacy and get established with a new dermatologist to continue on Dupixent.
Detail Level: Zone

## 2024-07-21 ENCOUNTER — APPOINTMENT (OUTPATIENT)
Dept: RADIOLOGY | Facility: HOSPITAL | Age: 89
DRG: 682 | End: 2024-07-21
Payer: MEDICARE

## 2024-07-21 ENCOUNTER — APPOINTMENT (OUTPATIENT)
Dept: CARDIOLOGY | Facility: HOSPITAL | Age: 89
DRG: 682 | End: 2024-07-21
Payer: MEDICARE

## 2024-07-21 ENCOUNTER — HOSPITAL ENCOUNTER (INPATIENT)
Facility: HOSPITAL | Age: 89
LOS: 2 days | Discharge: HOME HEALTH | DRG: 682 | End: 2024-07-24
Attending: STUDENT IN AN ORGANIZED HEALTH CARE EDUCATION/TRAINING PROGRAM | Admitting: HOSPITALIST
Payer: MEDICARE

## 2024-07-21 DIAGNOSIS — N17.9 AKI (ACUTE KIDNEY INJURY) (CMS-HCC): Primary | ICD-10-CM

## 2024-07-21 DIAGNOSIS — D32.9 MENINGIOMA (MULTI): ICD-10-CM

## 2024-07-21 DIAGNOSIS — I10 PRIMARY HYPERTENSION: ICD-10-CM

## 2024-07-21 DIAGNOSIS — N17.9 ACUTE KIDNEY INJURY (CMS-HCC): ICD-10-CM
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
## 2024-07-21 DIAGNOSIS — E83.52 HYPERCALCEMIA: ICD-10-CM

## 2024-07-21 PROBLEM — D70.9 NEUTROPENIA (CMS-HCC): Status: ACTIVE | Noted: 2024-07-21

## 2024-07-21 LAB
ANION GAP SERPL CALC-SCNC: 11 MMOL/L
APPEARANCE UR: CLEAR
BASOPHILS # BLD AUTO: 0.03 X10*3/UL (ref 0–0.1)
BASOPHILS NFR BLD AUTO: 1.2 %
BILIRUB UR STRIP.AUTO-MCNC: NEGATIVE MG/DL
BUN SERPL-MCNC: 31 MG/DL (ref 8–25)
CALCIUM SERPL-MCNC: 11.7 MG/DL (ref 8.5–10.4)
CHLORIDE SERPL-SCNC: 102 MMOL/L (ref 97–107)
CK SERPL-CCNC: 20 U/L (ref 24–195)
CO2 SERPL-SCNC: 26 MMOL/L (ref 24–31)
COLOR UR: NORMAL
CREAT SERPL-MCNC: 2.8 MG/DL (ref 0.4–1.6)
EGFRCR SERPLBLD CKD-EPI 2021: 16 ML/MIN/1.73M*2
EOSINOPHIL # BLD AUTO: 0.09 X10*3/UL (ref 0–0.4)
EOSINOPHIL NFR BLD AUTO: 3.7 %
ERYTHROCYTE [DISTWIDTH] IN BLOOD BY AUTOMATED COUNT: 14.2 % (ref 11.5–14.5)
GLUCOSE SERPL-MCNC: 103 MG/DL (ref 65–99)
GLUCOSE UR STRIP.AUTO-MCNC: NORMAL MG/DL
HCT VFR BLD AUTO: 35.5 % (ref 36–46)
HGB BLD-MCNC: 11.8 G/DL (ref 12–16)
HYALINE CASTS #/AREA URNS AUTO: ABNORMAL /LPF
IMM GRANULOCYTES # BLD AUTO: 0 X10*3/UL (ref 0–0.5)
IMM GRANULOCYTES NFR BLD AUTO: 0 % (ref 0–0.9)
KETONES UR STRIP.AUTO-MCNC: NEGATIVE MG/DL
LEUKOCYTE ESTERASE UR QL STRIP.AUTO: NEGATIVE
LYMPHOCYTES # BLD AUTO: 0.72 X10*3/UL (ref 0.8–3)
LYMPHOCYTES NFR BLD AUTO: 29.4 %
MCH RBC QN AUTO: 33.1 PG (ref 26–34)
MCHC RBC AUTO-ENTMCNC: 33.2 G/DL (ref 32–36)
MCV RBC AUTO: 100 FL (ref 80–100)
MONOCYTES # BLD AUTO: 0.27 X10*3/UL (ref 0.05–0.8)
MONOCYTES NFR BLD AUTO: 11 %
MUCOUS THREADS #/AREA URNS AUTO: ABNORMAL /LPF
NEUTROPHILS # BLD AUTO: 1.34 X10*3/UL (ref 1.6–5.5)
NEUTROPHILS NFR BLD AUTO: 54.7 %
NITRITE UR QL STRIP.AUTO: NEGATIVE
NRBC BLD-RTO: 0 /100 WBCS (ref 0–0)
PH UR STRIP.AUTO: 6 [PH]
PLATELET # BLD AUTO: 181 X10*3/UL (ref 150–450)
POTASSIUM SERPL-SCNC: 3.8 MMOL/L (ref 3.4–5.1)
PROT UR STRIP.AUTO-MCNC: NORMAL MG/DL
RBC # BLD AUTO: 3.56 X10*6/UL (ref 4–5.2)
RBC # UR STRIP.AUTO: NEGATIVE /UL
RBC #/AREA URNS AUTO: ABNORMAL /HPF
SODIUM SERPL-SCNC: 139 MMOL/L (ref 133–145)
SP GR UR STRIP.AUTO: 1.01
UROBILINOGEN UR STRIP.AUTO-MCNC: NORMAL MG/DL
WBC # BLD AUTO: 2.5 X10*3/UL (ref 4.4–11.3)
WBC #/AREA URNS AUTO: ABNORMAL /HPF

## 2024-07-21 PROCEDURE — 85025 COMPLETE CBC W/AUTO DIFF WBC: CPT | Performed by: STUDENT IN AN ORGANIZED HEALTH CARE EDUCATION/TRAINING PROGRAM

## 2024-07-21 PROCEDURE — 96372 THER/PROPH/DIAG INJ SC/IM: CPT | Performed by: INTERNAL MEDICINE

## 2024-07-21 PROCEDURE — G0378 HOSPITAL OBSERVATION PER HR: HCPCS

## 2024-07-21 PROCEDURE — 82374 ASSAY BLOOD CARBON DIOXIDE: CPT | Performed by: STUDENT IN AN ORGANIZED HEALTH CARE EDUCATION/TRAINING PROGRAM

## 2024-07-21 PROCEDURE — 86335 IMMUNFIX E-PHORSIS/URINE/CSF: CPT | Mod: WESLAB | Performed by: INTERNAL MEDICINE

## 2024-07-21 PROCEDURE — 70450 CT HEAD/BRAIN W/O DYE: CPT

## 2024-07-21 PROCEDURE — 99285 EMERGENCY DEPT VISIT HI MDM: CPT

## 2024-07-21 PROCEDURE — 2500000004 HC RX 250 GENERAL PHARMACY W/ HCPCS (ALT 636 FOR OP/ED): Performed by: INTERNAL MEDICINE

## 2024-07-21 PROCEDURE — 1210000001 HC SEMI-PRIVATE ROOM DAILY

## 2024-07-21 PROCEDURE — 36415 COLL VENOUS BLD VENIPUNCTURE: CPT | Performed by: STUDENT IN AN ORGANIZED HEALTH CARE EDUCATION/TRAINING PROGRAM

## 2024-07-21 PROCEDURE — 82550 ASSAY OF CK (CPK): CPT | Performed by: STUDENT IN AN ORGANIZED HEALTH CARE EDUCATION/TRAINING PROGRAM

## 2024-07-21 PROCEDURE — 81001 URINALYSIS AUTO W/SCOPE: CPT | Performed by: STUDENT IN AN ORGANIZED HEALTH CARE EDUCATION/TRAINING PROGRAM

## 2024-07-21 PROCEDURE — 93005 ELECTROCARDIOGRAM TRACING: CPT

## 2024-07-21 PROCEDURE — 93010 ELECTROCARDIOGRAM REPORT: CPT | Performed by: INTERNAL MEDICINE

## 2024-07-21 PROCEDURE — 70450 CT HEAD/BRAIN W/O DYE: CPT | Performed by: RADIOLOGY

## 2024-07-21 RX ORDER — PANTOPRAZOLE SODIUM 20 MG/1
20 TABLET, DELAYED RELEASE ORAL
Status: COMPLETED | OUTPATIENT
Start: 2024-07-22 | End: 2024-07-24

## 2024-07-21 RX ORDER — ACETAMINOPHEN 325 MG/1
650 TABLET ORAL EVERY 4 HOURS PRN
Status: DISCONTINUED | OUTPATIENT
Start: 2024-07-21 | End: 2024-07-24 | Stop reason: HOSPADM

## 2024-07-21 RX ORDER — HEPARIN SODIUM 5000 [USP'U]/ML
5000 INJECTION, SOLUTION INTRAVENOUS; SUBCUTANEOUS EVERY 8 HOURS SCHEDULED
Status: DISCONTINUED | OUTPATIENT
Start: 2024-07-21 | End: 2024-07-24 | Stop reason: HOSPADM

## 2024-07-21 RX ORDER — SODIUM CHLORIDE 9 MG/ML
75 INJECTION, SOLUTION INTRAVENOUS CONTINUOUS
Status: DISCONTINUED | OUTPATIENT
Start: 2024-07-21 | End: 2024-07-23

## 2024-07-21 RX ORDER — ACETAMINOPHEN 650 MG/1
650 SUPPOSITORY RECTAL EVERY 4 HOURS PRN
Status: DISCONTINUED | OUTPATIENT
Start: 2024-07-21 | End: 2024-07-24 | Stop reason: HOSPADM

## 2024-07-21 RX ORDER — ACETAMINOPHEN 160 MG/5ML
650 SOLUTION ORAL EVERY 4 HOURS PRN
Status: DISCONTINUED | OUTPATIENT
Start: 2024-07-21 | End: 2024-07-24 | Stop reason: HOSPADM

## 2024-07-21 RX ORDER — GUAIFENESIN/DEXTROMETHORPHAN 100-10MG/5
5 SYRUP ORAL EVERY 4 HOURS PRN
Status: DISCONTINUED | OUTPATIENT
Start: 2024-07-21 | End: 2024-07-24 | Stop reason: HOSPADM

## 2024-07-21 RX ORDER — SODIUM CHLORIDE 9 MG/ML
100 INJECTION, SOLUTION INTRAVENOUS CONTINUOUS
Status: DISCONTINUED | OUTPATIENT
Start: 2024-07-21 | End: 2024-07-22

## 2024-07-21 RX ORDER — ALBUTEROL SULFATE 0.83 MG/ML
3 SOLUTION RESPIRATORY (INHALATION) EVERY 6 HOURS PRN
Status: DISCONTINUED | OUTPATIENT
Start: 2024-07-21 | End: 2024-07-24 | Stop reason: HOSPADM

## 2024-07-21 RX ORDER — ONDANSETRON 4 MG/1
4 TABLET, ORALLY DISINTEGRATING ORAL EVERY 8 HOURS PRN
Status: DISCONTINUED | OUTPATIENT
Start: 2024-07-21 | End: 2024-07-24 | Stop reason: HOSPADM

## 2024-07-21 RX ORDER — GUAIFENESIN 600 MG/1
600 TABLET, EXTENDED RELEASE ORAL EVERY 12 HOURS PRN
Status: DISCONTINUED | OUTPATIENT
Start: 2024-07-21 | End: 2024-07-24 | Stop reason: HOSPADM

## 2024-07-21 RX ORDER — ONDANSETRON HYDROCHLORIDE 2 MG/ML
4 INJECTION, SOLUTION INTRAVENOUS EVERY 8 HOURS PRN
Status: DISCONTINUED | OUTPATIENT
Start: 2024-07-21 | End: 2024-07-24 | Stop reason: HOSPADM

## 2024-07-21 RX ORDER — AMLODIPINE BESYLATE 5 MG/1
5 TABLET ORAL DAILY
Status: DISCONTINUED | OUTPATIENT
Start: 2024-07-22 | End: 2024-07-22

## 2024-07-21 RX ORDER — POLYETHYLENE GLYCOL 3350 17 G/17G
17 POWDER, FOR SOLUTION ORAL DAILY PRN
Status: DISCONTINUED | OUTPATIENT
Start: 2024-07-21 | End: 2024-07-24 | Stop reason: HOSPADM

## 2024-07-21 RX ADMIN — SODIUM CHLORIDE 100 ML/HR: 900 INJECTION, SOLUTION INTRAVENOUS at 22:55

## 2024-07-21 RX ADMIN — SODIUM CHLORIDE 100 ML/HR: 900 INJECTION, SOLUTION INTRAVENOUS at 22:33

## 2024-07-21 RX ADMIN — ACETAMINOPHEN 650 MG: 325 TABLET ORAL at 22:55

## 2024-07-21 ASSESSMENT — COGNITIVE AND FUNCTIONAL STATUS - GENERAL
PERSONAL GROOMING: A LITTLE
STANDING UP FROM CHAIR USING ARMS: A LOT
TURNING FROM BACK TO SIDE WHILE IN FLAT BAD: A LITTLE
MOBILITY SCORE: 13
DRESSING REGULAR LOWER BODY CLOTHING: A LOT
DAILY ACTIVITIY SCORE: 16
MOVING TO AND FROM BED TO CHAIR: A LOT
DRESSING REGULAR UPPER BODY CLOTHING: A LITTLE
TOILETING: A LOT
WALKING IN HOSPITAL ROOM: A LOT
HELP NEEDED FOR BATHING: A LITTLE
EATING MEALS: A LITTLE
MOVING FROM LYING ON BACK TO SITTING ON SIDE OF FLAT BED WITH BEDRAILS: A LITTLE
CLIMB 3 TO 5 STEPS WITH RAILING: TOTAL
PATIENT BASELINE BEDBOUND: NO

## 2024-07-21 ASSESSMENT — PAIN DESCRIPTION - DESCRIPTORS: DESCRIPTORS: ACHING

## 2024-07-21 ASSESSMENT — PAIN DESCRIPTION - LOCATION: LOCATION: HEAD

## 2024-07-21 ASSESSMENT — COLUMBIA-SUICIDE SEVERITY RATING SCALE - C-SSRS
6. HAVE YOU EVER DONE ANYTHING, STARTED TO DO ANYTHING, OR PREPARED TO DO ANYTHING TO END YOUR LIFE?: NO
2. HAVE YOU ACTUALLY HAD ANY THOUGHTS OF KILLING YOURSELF?: NO
1. IN THE PAST MONTH, HAVE YOU WISHED YOU WERE DEAD OR WISHED YOU COULD GO TO SLEEP AND NOT WAKE UP?: NO

## 2024-07-21 ASSESSMENT — ACTIVITIES OF DAILY LIVING (ADL)
FEEDING YOURSELF: INDEPENDENT
LACK_OF_TRANSPORTATION: NO
WALKS IN HOME: NEEDS ASSISTANCE
ADEQUATE_TO_COMPLETE_ADL: NO
HEARING - LEFT EAR: FUNCTIONAL
GROOMING: NEEDS ASSISTANCE
ASSISTIVE_DEVICE: WALKER
DRESSING YOURSELF: NEEDS ASSISTANCE
HEARING - RIGHT EAR: FUNCTIONAL
PATIENT'S MEMORY ADEQUATE TO SAFELY COMPLETE DAILY ACTIVITIES?: YES
TOILETING: NEEDS ASSISTANCE
JUDGMENT_ADEQUATE_SAFELY_COMPLETE_DAILY_ACTIVITIES: YES
BATHING: NEEDS ASSISTANCE

## 2024-07-21 ASSESSMENT — PAIN - FUNCTIONAL ASSESSMENT
PAIN_FUNCTIONAL_ASSESSMENT: 0-10
PAIN_FUNCTIONAL_ASSESSMENT: WONG-BAKER FACES

## 2024-07-21 ASSESSMENT — PAIN SCALES - GENERAL
PAINLEVEL_OUTOF10: 3
PAINLEVEL_OUTOF10: 0 - NO PAIN
PAINLEVEL_OUTOF10: 0 - NO PAIN

## 2024-07-21 NOTE — ED NOTES
This nurse is currently in pt room. This nurse is doing pt assessment pt daughter is at bedside. Pt daughter is upset that she's been waiting for so long. Pt daughter states she mention to people at the  that she wanted a urine sample done on pt due to confusion. This nurse explained to pt that once he see's doctors he will notify the request the pt daughter wants. This nurse also suggested if she see's the doctor to also let the doctor know because she may see her before I do. Pt daughter states this is ridiculous I am a Registered Nurse and this is just ridiculous. This nurse told pt daughter he understand and apologize and will get the charge nurse for the pt.        Brandon Eden RN  07/21/24 1955

## 2024-07-21 NOTE — ED TRIAGE NOTES
Pt from home via EMS. States pt fell earlier and was not transported to the hospital. Pt took a nap, woke up and knocked over a glass and was unable to put in her dentures. Daughter states she called EMS because pt has had difficulty getting around after her fall this morning. States pt hit head head on tv when she fell earlier.

## 2024-07-22 ENCOUNTER — APPOINTMENT (OUTPATIENT)
Dept: RADIOLOGY | Facility: HOSPITAL | Age: 89
DRG: 682 | End: 2024-07-22
Payer: MEDICARE

## 2024-07-22 LAB
25(OH)D3 SERPL-MCNC: 43 NG/ML (ref 30–100)
ALBUMIN SERPL-MCNC: 3.2 G/DL (ref 3.5–5)
ALP BLD-CCNC: 67 U/L (ref 35–125)
ALT SERPL-CCNC: <5 U/L (ref 5–40)
ANION GAP SERPL CALC-SCNC: 9 MMOL/L
AST SERPL-CCNC: 10 U/L (ref 5–40)
ATRIAL RATE: 72 BPM
BASOPHILS # BLD AUTO: 0.02 X10*3/UL (ref 0–0.1)
BASOPHILS NFR BLD AUTO: 1 %
BILIRUB SERPL-MCNC: 0.4 MG/DL (ref 0.1–1.2)
BUN SERPL-MCNC: 31 MG/DL (ref 8–25)
CA-I BLD-SCNC: 1.46 MMOL/L (ref 1.1–1.33)
CALCIUM SERPL-MCNC: 10.4 MG/DL (ref 8.5–10.4)
CHLORIDE SERPL-SCNC: 105 MMOL/L (ref 97–107)
CO2 SERPL-SCNC: 28 MMOL/L (ref 24–31)
CREAT SERPL-MCNC: 2.9 MG/DL (ref 0.4–1.6)
EGFRCR SERPLBLD CKD-EPI 2021: 15 ML/MIN/1.73M*2
EOSINOPHIL # BLD AUTO: 0.09 X10*3/UL (ref 0–0.4)
EOSINOPHIL NFR BLD AUTO: 4.5 %
ERYTHROCYTE [DISTWIDTH] IN BLOOD BY AUTOMATED COUNT: 14.3 % (ref 11.5–14.5)
FERRITIN SERPL-MCNC: 154 NG/ML (ref 13–150)
FOLATE SERPL-MCNC: 8.8 NG/ML (ref 4.2–19.9)
GLUCOSE SERPL-MCNC: 91 MG/DL (ref 65–99)
HCT VFR BLD AUTO: 32.2 % (ref 36–46)
HGB BLD-MCNC: 10.5 G/DL (ref 12–16)
HOLD SPECIMEN: NORMAL
IMM GRANULOCYTES # BLD AUTO: 0 X10*3/UL (ref 0–0.5)
IMM GRANULOCYTES NFR BLD AUTO: 0 % (ref 0–0.9)
IRON SATN MFR SERPL: 32 % (ref 12–50)
IRON SERPL-MCNC: 74 UG/DL (ref 30–160)
LYMPHOCYTES # BLD AUTO: 0.6 X10*3/UL (ref 0.8–3)
LYMPHOCYTES NFR BLD AUTO: 29.9 %
MCH RBC QN AUTO: 33.1 PG (ref 26–34)
MCHC RBC AUTO-ENTMCNC: 32.6 G/DL (ref 32–36)
MCV RBC AUTO: 102 FL (ref 80–100)
MONOCYTES # BLD AUTO: 0.18 X10*3/UL (ref 0.05–0.8)
MONOCYTES NFR BLD AUTO: 9 %
NEUTROPHILS # BLD AUTO: 1.12 X10*3/UL (ref 1.6–5.5)
NEUTROPHILS NFR BLD AUTO: 55.6 %
NRBC BLD-RTO: 0 /100 WBCS (ref 0–0)
P AXIS: 77 DEGREES
P OFFSET: 176 MS
P ONSET: 124 MS
PLATELET # BLD AUTO: 169 X10*3/UL (ref 150–450)
POTASSIUM SERPL-SCNC: 3.6 MMOL/L (ref 3.4–5.1)
PR INTERVAL: 176 MS
PROT SERPL-MCNC: 5.2 G/DL (ref 6.4–8.2)
PROT SERPL-MCNC: 5.4 G/DL (ref 5.9–7.9)
PROT UR-ACNC: 25 MG/DL (ref 5–25)
PTH-INTACT SERPL-MCNC: 10 PG/ML (ref 18.5–88)
Q ONSET: 212 MS
QRS COUNT: 12 BEATS
QRS DURATION: 92 MS
QT INTERVAL: 414 MS
QTC CALCULATION(BAZETT): 453 MS
QTC FREDERICIA: 440 MS
R AXIS: -61 DEGREES
RBC # BLD AUTO: 3.17 X10*6/UL (ref 4–5.2)
SODIUM SERPL-SCNC: 142 MMOL/L (ref 133–145)
T AXIS: 63 DEGREES
T OFFSET: 419 MS
TIBC SERPL-MCNC: 233 UG/DL (ref 228–428)
TSH SERPL DL<=0.05 MIU/L-ACNC: 1.45 MIU/L (ref 0.27–4.2)
UIBC SERPL-MCNC: 159 UG/DL (ref 110–370)
VENTRICULAR RATE: 72 BPM
VIT B12 SERPL-MCNC: 790 PG/ML (ref 211–946)
WBC # BLD AUTO: 2 X10*3/UL (ref 4.4–11.3)

## 2024-07-22 PROCEDURE — 2500000001 HC RX 250 WO HCPCS SELF ADMINISTERED DRUGS (ALT 637 FOR MEDICARE OP): Performed by: INTERNAL MEDICINE

## 2024-07-22 PROCEDURE — G0378 HOSPITAL OBSERVATION PER HR: HCPCS

## 2024-07-22 PROCEDURE — 83540 ASSAY OF IRON: CPT | Performed by: INTERNAL MEDICINE

## 2024-07-22 PROCEDURE — 76770 US EXAM ABDO BACK WALL COMP: CPT | Performed by: RADIOLOGY

## 2024-07-22 PROCEDURE — 97166 OT EVAL MOD COMPLEX 45 MIN: CPT | Mod: GO

## 2024-07-22 PROCEDURE — 97110 THERAPEUTIC EXERCISES: CPT | Mod: GP | Performed by: PHYSICAL THERAPIST

## 2024-07-22 PROCEDURE — 96372 THER/PROPH/DIAG INJ SC/IM: CPT | Performed by: INTERNAL MEDICINE

## 2024-07-22 PROCEDURE — 97535 SELF CARE MNGMENT TRAINING: CPT | Mod: GO

## 2024-07-22 PROCEDURE — 36415 COLL VENOUS BLD VENIPUNCTURE: CPT | Performed by: INTERNAL MEDICINE

## 2024-07-22 PROCEDURE — 2500000001 HC RX 250 WO HCPCS SELF ADMINISTERED DRUGS (ALT 637 FOR MEDICARE OP): Performed by: HOSPITALIST

## 2024-07-22 PROCEDURE — 85025 COMPLETE CBC W/AUTO DIFF WBC: CPT | Performed by: INTERNAL MEDICINE

## 2024-07-22 PROCEDURE — 82728 ASSAY OF FERRITIN: CPT | Performed by: INTERNAL MEDICINE

## 2024-07-22 PROCEDURE — 83970 ASSAY OF PARATHORMONE: CPT | Mod: WESLAB | Performed by: INTERNAL MEDICINE

## 2024-07-22 PROCEDURE — 2500000004 HC RX 250 GENERAL PHARMACY W/ HCPCS (ALT 636 FOR OP/ED): Performed by: INTERNAL MEDICINE

## 2024-07-22 PROCEDURE — 82746 ASSAY OF FOLIC ACID SERUM: CPT | Performed by: HOSPITALIST

## 2024-07-22 PROCEDURE — 97161 PT EVAL LOW COMPLEX 20 MIN: CPT | Mod: GP | Performed by: PHYSICAL THERAPIST

## 2024-07-22 PROCEDURE — 82607 VITAMIN B-12: CPT | Performed by: HOSPITALIST

## 2024-07-22 PROCEDURE — 86334 IMMUNOFIX E-PHORESIS SERUM: CPT | Mod: WESLAB | Performed by: INTERNAL MEDICINE

## 2024-07-22 PROCEDURE — 1210000001 HC SEMI-PRIVATE ROOM DAILY

## 2024-07-22 PROCEDURE — 82652 VIT D 1 25-DIHYDROXY: CPT | Mod: WESLAB | Performed by: INTERNAL MEDICINE

## 2024-07-22 PROCEDURE — 80053 COMPREHEN METABOLIC PANEL: CPT | Performed by: INTERNAL MEDICINE

## 2024-07-22 PROCEDURE — 82306 VITAMIN D 25 HYDROXY: CPT | Mod: WESLAB | Performed by: INTERNAL MEDICINE

## 2024-07-22 PROCEDURE — 97530 THERAPEUTIC ACTIVITIES: CPT | Mod: GP | Performed by: PHYSICAL THERAPIST

## 2024-07-22 PROCEDURE — 84443 ASSAY THYROID STIM HORMONE: CPT | Performed by: INTERNAL MEDICINE

## 2024-07-22 PROCEDURE — 76770 US EXAM ABDO BACK WALL COMP: CPT

## 2024-07-22 PROCEDURE — 2500000002 HC RX 250 W HCPCS SELF ADMINISTERED DRUGS (ALT 637 FOR MEDICARE OP, ALT 636 FOR OP/ED): Performed by: INTERNAL MEDICINE

## 2024-07-22 PROCEDURE — 82330 ASSAY OF CALCIUM: CPT | Performed by: INTERNAL MEDICINE

## 2024-07-22 PROCEDURE — 2500000002 HC RX 250 W HCPCS SELF ADMINISTERED DRUGS (ALT 637 FOR MEDICARE OP, ALT 636 FOR OP/ED): Performed by: HOSPITALIST

## 2024-07-22 RX ORDER — MIRTAZAPINE 15 MG/1
7.5 TABLET, FILM COATED ORAL NIGHTLY
Status: DISCONTINUED | OUTPATIENT
Start: 2024-07-22 | End: 2024-07-24 | Stop reason: HOSPADM

## 2024-07-22 RX ORDER — ALPRAZOLAM 0.25 MG/1
0.25 TABLET ORAL 2 TIMES DAILY PRN
Status: DISCONTINUED | OUTPATIENT
Start: 2024-07-22 | End: 2024-07-24 | Stop reason: HOSPADM

## 2024-07-22 RX ORDER — LEVOTHYROXINE SODIUM 75 UG/1
75 TABLET ORAL DAILY
COMMUNITY

## 2024-07-22 RX ORDER — PRASUGREL 10 MG/1
10 TABLET, FILM COATED ORAL DAILY
COMMUNITY

## 2024-07-22 RX ORDER — ASPIRIN 81 MG/1
81 TABLET ORAL DAILY
Status: DISCONTINUED | OUTPATIENT
Start: 2024-07-22 | End: 2024-07-24 | Stop reason: HOSPADM

## 2024-07-22 RX ORDER — LEVOTHYROXINE SODIUM 75 UG/1
75 TABLET ORAL DAILY
Status: DISCONTINUED | OUTPATIENT
Start: 2024-07-23 | End: 2024-07-24 | Stop reason: HOSPADM

## 2024-07-22 RX ORDER — HYDRALAZINE HYDROCHLORIDE 20 MG/ML
5 INJECTION INTRAMUSCULAR; INTRAVENOUS EVERY 6 HOURS PRN
Status: DISCONTINUED | OUTPATIENT
Start: 2024-07-22 | End: 2024-07-24 | Stop reason: HOSPADM

## 2024-07-22 RX ORDER — LISINOPRIL 2.5 MG/1
2.5 TABLET ORAL DAILY
COMMUNITY
End: 2024-07-24 | Stop reason: HOSPADM

## 2024-07-22 RX ORDER — ASPIRIN 81 MG/1
81 TABLET ORAL DAILY
COMMUNITY

## 2024-07-22 RX ORDER — AMLODIPINE BESYLATE 10 MG/1
10 TABLET ORAL DAILY
Status: DISCONTINUED | OUTPATIENT
Start: 2024-07-22 | End: 2024-07-24 | Stop reason: HOSPADM

## 2024-07-22 RX ORDER — MIRTAZAPINE 7.5 MG/1
7.5 TABLET, FILM COATED ORAL NIGHTLY
COMMUNITY

## 2024-07-22 RX ORDER — ALPRAZOLAM 0.25 MG/1
0.25 TABLET ORAL 2 TIMES DAILY PRN
COMMUNITY

## 2024-07-22 RX ORDER — PRASUGREL 10 MG/1
10 TABLET, FILM COATED ORAL DAILY
Status: DISCONTINUED | OUTPATIENT
Start: 2024-07-22 | End: 2024-07-24 | Stop reason: HOSPADM

## 2024-07-22 RX ADMIN — HEPARIN SODIUM 5000 UNITS: 5000 INJECTION, SOLUTION INTRAVENOUS; SUBCUTANEOUS at 13:55

## 2024-07-22 RX ADMIN — SODIUM CHLORIDE 100 ML/HR: 900 INJECTION, SOLUTION INTRAVENOUS at 13:55

## 2024-07-22 RX ADMIN — HYDRALAZINE HYDROCHLORIDE 5 MG: 20 INJECTION INTRAMUSCULAR; INTRAVENOUS at 08:55

## 2024-07-22 RX ADMIN — MIRTAZAPINE 7.5 MG: 15 TABLET, FILM COATED ORAL at 21:53

## 2024-07-22 RX ADMIN — ALPRAZOLAM 0.25 MG: 0.25 TABLET ORAL at 21:53

## 2024-07-22 RX ADMIN — HEPARIN SODIUM 5000 UNITS: 5000 INJECTION, SOLUTION INTRAVENOUS; SUBCUTANEOUS at 21:53

## 2024-07-22 RX ADMIN — ACETAMINOPHEN 650 MG: 325 TABLET ORAL at 21:53

## 2024-07-22 RX ADMIN — PRASUGREL 10 MG: 10 TABLET, FILM COATED ORAL at 18:44

## 2024-07-22 RX ADMIN — AMLODIPINE BESYLATE 10 MG: 10 TABLET ORAL at 08:55

## 2024-07-22 RX ADMIN — ASPIRIN 81 MG: 81 TABLET, COATED ORAL at 18:12

## 2024-07-22 RX ADMIN — PANTOPRAZOLE SODIUM 20 MG: 20 TABLET, DELAYED RELEASE ORAL at 06:40

## 2024-07-22 SDOH — SOCIAL STABILITY: SOCIAL NETWORK: ARE YOU MARRIED, WIDOWED, DIVORCED, SEPARATED, NEVER MARRIED, OR LIVING WITH A PARTNER?: WIDOWED

## 2024-07-22 SDOH — SOCIAL STABILITY: SOCIAL NETWORK: HOW OFTEN DO YOU GET TOGETHER WITH FRIENDS OR RELATIVES?: MORE THAN THREE TIMES A WEEK

## 2024-07-22 SDOH — HEALTH STABILITY: MENTAL HEALTH: HOW MANY STANDARD DRINKS CONTAINING ALCOHOL DO YOU HAVE ON A TYPICAL DAY?: 1 OR 2

## 2024-07-22 SDOH — ECONOMIC STABILITY: HOUSING INSECURITY: IN THE PAST 12 MONTHS, HOW MANY TIMES HAVE YOU MOVED WHERE YOU WERE LIVING?: 2

## 2024-07-22 SDOH — HEALTH STABILITY: MENTAL HEALTH: HOW OFTEN DO YOU HAVE A DRINK CONTAINING ALCOHOL?: 4 OR MORE TIMES A WEEK

## 2024-07-22 SDOH — ECONOMIC STABILITY: INCOME INSECURITY: IN THE LAST 12 MONTHS, WAS THERE A TIME WHEN YOU WERE NOT ABLE TO PAY THE MORTGAGE OR RENT ON TIME?: NO

## 2024-07-22 SDOH — SOCIAL STABILITY: SOCIAL INSECURITY: DO YOU FEEL ANYONE HAS EXPLOITED OR TAKEN ADVANTAGE OF YOU FINANCIALLY OR OF YOUR PERSONAL PROPERTY?: NO

## 2024-07-22 SDOH — SOCIAL STABILITY: SOCIAL INSECURITY: HAS ANYONE EVER THREATENED TO HURT YOUR FAMILY OR YOUR PETS?: NO

## 2024-07-22 SDOH — SOCIAL STABILITY: SOCIAL INSECURITY: DOES ANYONE TRY TO KEEP YOU FROM HAVING/CONTACTING OTHER FRIENDS OR DOING THINGS OUTSIDE YOUR HOME?: NO

## 2024-07-22 SDOH — ECONOMIC STABILITY: FOOD INSECURITY: WITHIN THE PAST 12 MONTHS, YOU WORRIED THAT YOUR FOOD WOULD RUN OUT BEFORE YOU GOT MONEY TO BUY MORE.: NEVER TRUE

## 2024-07-22 SDOH — SOCIAL STABILITY: SOCIAL INSECURITY: WITHIN THE LAST YEAR, HAVE YOU BEEN HUMILIATED OR EMOTIONALLY ABUSED IN OTHER WAYS BY YOUR PARTNER OR EX-PARTNER?: NO

## 2024-07-22 SDOH — ECONOMIC STABILITY: FOOD INSECURITY: WITHIN THE PAST 12 MONTHS, THE FOOD YOU BOUGHT JUST DIDN'T LAST AND YOU DIDN'T HAVE MONEY TO GET MORE.: NEVER TRUE

## 2024-07-22 SDOH — SOCIAL STABILITY: SOCIAL INSECURITY: WERE YOU ABLE TO COMPLETE ALL THE BEHAVIORAL HEALTH SCREENINGS?: YES

## 2024-07-22 SDOH — ECONOMIC STABILITY: TRANSPORTATION INSECURITY
IN THE PAST 12 MONTHS, HAS LACK OF TRANSPORTATION KEPT YOU FROM MEETINGS, WORK, OR FROM GETTING THINGS NEEDED FOR DAILY LIVING?: NO

## 2024-07-22 SDOH — ECONOMIC STABILITY: INCOME INSECURITY: IN THE PAST 12 MONTHS, HAS THE ELECTRIC, GAS, OIL, OR WATER COMPANY THREATENED TO SHUT OFF SERVICE IN YOUR HOME?: NO

## 2024-07-22 SDOH — SOCIAL STABILITY: SOCIAL INSECURITY: ABUSE: ADULT

## 2024-07-22 SDOH — SOCIAL STABILITY: SOCIAL INSECURITY
WITHIN THE LAST YEAR, HAVE TO BEEN RAPED OR FORCED TO HAVE ANY KIND OF SEXUAL ACTIVITY BY YOUR PARTNER OR EX-PARTNER?: NO

## 2024-07-22 SDOH — HEALTH STABILITY: MENTAL HEALTH: HOW OFTEN DO YOU HAVE 6 OR MORE DRINKS ON ONE OCCASION?: NEVER

## 2024-07-22 SDOH — ECONOMIC STABILITY: HOUSING INSECURITY: AT ANY TIME IN THE PAST 12 MONTHS, WERE YOU HOMELESS OR LIVING IN A SHELTER (INCLUDING NOW)?: NO

## 2024-07-22 SDOH — HEALTH STABILITY: PHYSICAL HEALTH: ON AVERAGE, HOW MANY MINUTES DO YOU ENGAGE IN EXERCISE AT THIS LEVEL?: 0 MIN

## 2024-07-22 SDOH — SOCIAL STABILITY: SOCIAL INSECURITY: ARE THERE ANY APPARENT SIGNS OF INJURIES/BEHAVIORS THAT COULD BE RELATED TO ABUSE/NEGLECT?: NO

## 2024-07-22 SDOH — SOCIAL STABILITY: SOCIAL INSECURITY: DO YOU FEEL UNSAFE GOING BACK TO THE PLACE WHERE YOU ARE LIVING?: NO

## 2024-07-22 SDOH — SOCIAL STABILITY: SOCIAL INSECURITY: HAVE YOU HAD THOUGHTS OF HARMING ANYONE ELSE?: NO

## 2024-07-22 SDOH — SOCIAL STABILITY: SOCIAL INSECURITY: ARE YOU OR HAVE YOU BEEN THREATENED OR ABUSED PHYSICALLY, EMOTIONALLY, OR SEXUALLY BY ANYONE?: NO

## 2024-07-22 SDOH — HEALTH STABILITY: PHYSICAL HEALTH: ON AVERAGE, HOW MANY DAYS PER WEEK DO YOU ENGAGE IN MODERATE TO STRENUOUS EXERCISE (LIKE A BRISK WALK)?: 0 DAYS

## 2024-07-22 SDOH — SOCIAL STABILITY: SOCIAL NETWORK
IN A TYPICAL WEEK, HOW MANY TIMES DO YOU TALK ON THE PHONE WITH FAMILY, FRIENDS, OR NEIGHBORS?: MORE THAN THREE TIMES A WEEK

## 2024-07-22 SDOH — SOCIAL STABILITY: SOCIAL INSECURITY
WITHIN THE LAST YEAR, HAVE YOU BEEN KICKED, HIT, SLAPPED, OR OTHERWISE PHYSICALLY HURT BY YOUR PARTNER OR EX-PARTNER?: NO

## 2024-07-22 SDOH — SOCIAL STABILITY: SOCIAL INSECURITY: HAVE YOU HAD ANY THOUGHTS OF HARMING ANYONE ELSE?: NO

## 2024-07-22 SDOH — SOCIAL STABILITY: SOCIAL INSECURITY: WITHIN THE LAST YEAR, HAVE YOU BEEN AFRAID OF YOUR PARTNER OR EX-PARTNER?: NO

## 2024-07-22 SDOH — ECONOMIC STABILITY: INCOME INSECURITY: HOW HARD IS IT FOR YOU TO PAY FOR THE VERY BASICS LIKE FOOD, HOUSING, MEDICAL CARE, AND HEATING?: NOT HARD AT ALL

## 2024-07-22 SDOH — ECONOMIC STABILITY: TRANSPORTATION INSECURITY
IN THE PAST 12 MONTHS, HAS THE LACK OF TRANSPORTATION KEPT YOU FROM MEDICAL APPOINTMENTS OR FROM GETTING MEDICATIONS?: NO

## 2024-07-22 SDOH — HEALTH STABILITY: MENTAL HEALTH
HOW OFTEN DO YOU NEED TO HAVE SOMEONE HELP YOU WHEN YOU READ INSTRUCTIONS, PAMPHLETS, OR OTHER WRITTEN MATERIAL FROM YOUR DOCTOR OR PHARMACY?: SOMETIMES

## 2024-07-22 ASSESSMENT — PAIN - FUNCTIONAL ASSESSMENT
PAIN_FUNCTIONAL_ASSESSMENT: 0-10
PAIN_FUNCTIONAL_ASSESSMENT: WONG-BAKER FACES
PAIN_FUNCTIONAL_ASSESSMENT: 0-10

## 2024-07-22 ASSESSMENT — PATIENT HEALTH QUESTIONNAIRE - PHQ9
2. FEELING DOWN, DEPRESSED OR HOPELESS: NOT AT ALL
1. LITTLE INTEREST OR PLEASURE IN DOING THINGS: NOT AT ALL
SUM OF ALL RESPONSES TO PHQ9 QUESTIONS 1 & 2: 0

## 2024-07-22 ASSESSMENT — COGNITIVE AND FUNCTIONAL STATUS - GENERAL
TURNING FROM BACK TO SIDE WHILE IN FLAT BAD: A LOT
DRESSING REGULAR UPPER BODY CLOTHING: A LITTLE
PERSONAL GROOMING: A LITTLE
HELP NEEDED FOR BATHING: A LOT
PERSONAL GROOMING: A LITTLE
DRESSING REGULAR UPPER BODY CLOTHING: A LITTLE
HELP NEEDED FOR BATHING: A LOT
TOILETING: A LITTLE
DRESSING REGULAR LOWER BODY CLOTHING: A LOT
MOVING FROM LYING ON BACK TO SITTING ON SIDE OF FLAT BED WITH BEDRAILS: A LITTLE
STANDING UP FROM CHAIR USING ARMS: A LITTLE
DRESSING REGULAR LOWER BODY CLOTHING: A LOT
MOVING TO AND FROM BED TO CHAIR: A LITTLE
WALKING IN HOSPITAL ROOM: A LOT
DAILY ACTIVITIY SCORE: 16
CLIMB 3 TO 5 STEPS WITH RAILING: A LOT
TURNING FROM BACK TO SIDE WHILE IN FLAT BAD: A LOT
STANDING UP FROM CHAIR USING ARMS: A LITTLE
MOVING FROM LYING ON BACK TO SITTING ON SIDE OF FLAT BED WITH BEDRAILS: A LITTLE
STANDING UP FROM CHAIR USING ARMS: A LITTLE
TOILETING: A LOT
TOILETING: A LOT
DRESSING REGULAR LOWER BODY CLOTHING: A LITTLE
HELP NEEDED FOR BATHING: A LITTLE
PERSONAL GROOMING: A LITTLE
MOBILITY SCORE: 15
WALKING IN HOSPITAL ROOM: A LOT
MOVING FROM LYING ON BACK TO SITTING ON SIDE OF FLAT BED WITH BEDRAILS: A LITTLE
EATING MEALS: A LITTLE
MOVING TO AND FROM BED TO CHAIR: A LITTLE
MOBILITY SCORE: 15
CLIMB 3 TO 5 STEPS WITH RAILING: A LOT
DAILY ACTIVITIY SCORE: 18
DAILY ACTIVITIY SCORE: 16
MOBILITY SCORE: 15
CLIMB 3 TO 5 STEPS WITH RAILING: A LOT
WALKING IN HOSPITAL ROOM: A LOT
DRESSING REGULAR UPPER BODY CLOTHING: A LITTLE
TURNING FROM BACK TO SIDE WHILE IN FLAT BAD: A LOT
MOVING TO AND FROM BED TO CHAIR: A LITTLE

## 2024-07-22 ASSESSMENT — LIFESTYLE VARIABLES
AUDIT-C TOTAL SCORE: 3
SKIP TO QUESTIONS 9-10: 1
AUDIT-C TOTAL SCORE: 3
HOW MANY STANDARD DRINKS CONTAINING ALCOHOL DO YOU HAVE ON A TYPICAL DAY: 1 OR 2
SKIP TO QUESTIONS 9-10: 1
AUDIT-C TOTAL SCORE: 4
HOW OFTEN DO YOU HAVE A DRINK CONTAINING ALCOHOL: 2-3 TIMES A WEEK
HOW OFTEN DO YOU HAVE 6 OR MORE DRINKS ON ONE OCCASION: NEVER

## 2024-07-22 ASSESSMENT — ACTIVITIES OF DAILY LIVING (ADL)
BATHING_ASSISTANCE: MODERATE
ADL_ASSISTANCE: INDEPENDENT
HOME_MANAGEMENT_TIME_ENTRY: 15
ADL_ASSISTANCE: INDEPENDENT

## 2024-07-22 ASSESSMENT — PAIN DESCRIPTION - DESCRIPTORS
DESCRIPTORS: ACHING

## 2024-07-22 ASSESSMENT — PAIN SCALES - GENERAL
PAINLEVEL_OUTOF10: 0 - NO PAIN
PAINLEVEL_OUTOF10: 4
PAINLEVEL_OUTOF10: 0 - NO PAIN
PAINLEVEL_OUTOF10: 8
PAINLEVEL_OUTOF10: 8
PAINLEVEL_OUTOF10: 0 - NO PAIN
PAINLEVEL_OUTOF10: 3

## 2024-07-22 ASSESSMENT — ENCOUNTER SYMPTOMS
CONSTIPATION: 0
ABDOMINAL PAIN: 0
HEMATURIA: 0
SHORTNESS OF BREATH: 0
CHILLS: 0
FEVER: 0
DIARRHEA: 0
FREQUENCY: 0
DYSURIA: 0
COUGH: 0

## 2024-07-22 ASSESSMENT — PAIN DESCRIPTION - LOCATION: LOCATION: HEAD

## 2024-07-22 NOTE — PROGRESS NOTES
07/22/24 1446   [facility] Disability Status   Are you deaf or do you have serious difficulty hearing? N   Are you blind or do you have serious difficulty seeing, even when wearing glasses? N   Because of a physical, mental, or emotional condition, do you have serious difficulty concentrating, remembering, or making decisions? (5 years old or older) N   Do you have serious difficulty walking or climbing stairs? N   Do you have serious difficulty dressing or bathing? N   Because of a physical, mental, or emotional condition, do you have serious difficulty doing errands alone such as visiting the doctor? N

## 2024-07-22 NOTE — CARE PLAN
The patient's goals for the shift include no falls    The clinical goals for the shift include no falls  Problem: Skin  Goal: Decreased wound size/increased tissue granulation at next dressing change  Outcome: Progressing  Goal: Participates in plan/prevention/treatment measures  Outcome: Progressing  Goal: Prevent/manage excess moisture  Outcome: Progressing  Goal: Prevent/minimize sheer/friction injuries  Outcome: Progressing  Goal: Promote/optimize nutrition  Outcome: Progressing  Goal: Promote skin healing  Outcome: Progressing     Problem: Fall/Injury  Goal: Not fall by end of shift  Outcome: Progressing  Goal: Be free from injury by end of the shift  Outcome: Progressing  Goal: Verbalize understanding of personal risk factors for fall in the hospital  Outcome: Progressing  Goal: Verbalize understanding of risk factor reduction measures to prevent injury from fall in the home  Outcome: Progressing  Goal: Use assistive devices by end of the shift  Outcome: Progressing  Goal: Pace activities to prevent fatigue by end of the shift  Outcome: Progressing

## 2024-07-22 NOTE — PROGRESS NOTES
TCC met with patient and granddaughter at the bedside. Pt lives with daughter in a house. There is one step to enter and no stairs inside the house. Pt has multiple grab bars in the shower and bathroom. Pt is not on oxygen or dialysis. Pt uses a walker and a rollator. Pt's daughter and granddaughter transport pt to [facility]'s. Family shops, cooks and cleans. Pt does not smoke. Pt has 1 glass of wine daily. Pt is not a diabetic. PCP is Dr. Reddy. Pharmacy of choice is [facility] but uncertain location. Pt has a LW and POA is daughter Gisele. PT/OT skilled patient moderate intensity. Pt declined SNF at this time but would like C. Gave list to patient. Patient chose [facility]. Sent referral via [employer] TCC will follow.     DISCHARGE PLAN TO RETURN HOME WITH [facility], WAITING FOR ACCEPTANCE.     UPDATE 1530: [facility] ACCEPTED, WILL NEED EXTERNAL REFERRAL PLACED FOR PT/OT WITH DX OF MENINGIOMA.       07/22/24 1439   Discharge Planning   Living Arrangements Children   Support Systems Children   Assistance Needed no   Type of Residence Private residence   Do you have animals or pets at home? No   Who is requesting discharge planning? Provider   Home or Post Acute Services In home services   Type of Home Care Services Home OT;Home PT   Expected Discharge Disposition  Services   Does the patient need discharge transport arranged? Yes   RoundTrip coordination needed? Yes   Financial Resource Strain   How hard is it for you to pay for the very basics like food, housing, medical care, and heating? Not hard   Housing Stability   In the last 12 months, was there a time when you were not able to pay the mortgage or rent on time? N   In the past 12 months, how many times have you moved where you were living? 2   At any time in the past 12 months, were you homeless or living in a shelter (including now)? N   Transportation Needs   In the past 12 months, has lack of transportation kept you from medical appointments or from getting  medications? no   In the past 12 months, has lack of transportation kept you from meetings, work, or from getting things needed for daily living? No

## 2024-07-22 NOTE — CONSULTS
Inpatient consult to Nephrology  Consult performed by: Keyur Arciniega MD  Consult ordered by: Levy Dave MD  Reason for consult: Acute kidney injury        History Of Present Illness  Marisela Whitlock is a 89-year-old pleasant  female with history of CKD, hypertension and COPD, admitted following a mechanical fall at home.  She denied loss of consciousness.    Renal consultation has been requested for evaluation and management of acute kidney injury.  Her baseline SCr is unknown as she recently moved to [facility] from [place].  However, she stated that she had been told that her kidneys were not working well while she was in [place] but could not tell what stage or what her eGFR was.  She reports decent urine volumes and denies dysuria or gross hematuria.    Of note, she was not taking NSAIDs and no recent iodinated contrast exposure.      Past Medical History  She has a past medical history of COPD (chronic obstructive pulmonary disease) (Multi).    Surgical History  She has no past surgical history on file.     Social History  She reports that she quit smoking about 34 years ago. Her smoking use included cigarettes. She has never used smokeless tobacco. No history on file for alcohol use and drug use.  She recently moved from [facility] and has been living with her daughter.    Family History  No known family history of kidney disease.     Medications  Scheduled medications  amLODIPine, 10 mg, oral, Daily  heparin (porcine), 5,000 Units, subcutaneous, q8h JAMES  pantoprazole, 20 mg, oral, Daily before breakfast      Continuous medications  sodium chloride 0.9%, 100 mL/hr, Last Rate: 100 mL/hr (07/22/24 4036)  sodium chloride 0.9%, 100 mL/hr, Last Rate: 100 mL/hr (07/22/24 1355)      PRN medications  PRN medications: acetaminophen **OR** acetaminophen **OR** acetaminophen, albuterol, benzocaine-menthol, dextromethorphan-guaifenesin, guaiFENesin, hydrALAZINE, ondansetron ODT **OR**  "ondansetron, polyethylene glycol    Allergies  Patient has no known allergies.    Review of Systems  10 point ROS negative except as stated in HPI.     Physical Exam  Vitals 24HR  Heart Rate:  [70-80]   Temp:  [36.3 °C (97.3 °F)-36.7 °C (98.1 °F)]   Resp:  [16-19]   BP: (114-220)/(61-98)   Height:  [157.5 cm (5' 2\")]   Weight:  [54.6 kg (120 lb 5.1 oz)]   SpO2:  [97 %-100 %]     General: Elderly woman, not in distress  Eyes: Not pale, anicteric  Neck: Supple, no JVD  Lungs: Clear bilaterally  Heart: S1 and S2, regular  Abdomen: Soft, nontender  Extremities: No pedal edema  Neuro: Awake and interactive         I&O 24HR    Intake/Output Summary (Last 24 hours) at 7/22/2024 1536  Last data filed at 7/22/2024 1534  Gross per 24 hour   Intake 2546.67 ml   Output 240 ml   Net 2306.67 ml       Relevant Results  Results for orders placed or performed during the hospital encounter of 07/21/24 (from the past 24 hour(s))   CBC and Auto Differential   Result Value Ref Range    WBC 2.5 (L) 4.4 - 11.3 x10*3/uL    nRBC 0.0 0.0 - 0.0 /100 WBCs    RBC 3.56 (L) 4.00 - 5.20 x10*6/uL    Hemoglobin 11.8 (L) 12.0 - 16.0 g/dL    Hematocrit 35.5 (L) 36.0 - 46.0 %     80 - 100 fL    MCH 33.1 26.0 - 34.0 pg    MCHC 33.2 32.0 - 36.0 g/dL    RDW 14.2 11.5 - 14.5 %    Platelets 181 150 - 450 x10*3/uL    Neutrophils % 54.7 40.0 - 80.0 %    Immature Granulocytes %, Automated 0.0 0.0 - 0.9 %    Lymphocytes % 29.4 13.0 - 44.0 %    Monocytes % 11.0 2.0 - 10.0 %    Eosinophils % 3.7 0.0 - 6.0 %    Basophils % 1.2 0.0 - 2.0 %    Neutrophils Absolute 1.34 (L) 1.60 - 5.50 x10*3/uL    Immature Granulocytes Absolute, Automated 0.00 0.00 - 0.50 x10*3/uL    Lymphocytes Absolute 0.72 (L) 0.80 - 3.00 x10*3/uL    Monocytes Absolute 0.27 0.05 - 0.80 x10*3/uL    Eosinophils Absolute 0.09 0.00 - 0.40 x10*3/uL    Basophils Absolute 0.03 0.00 - 0.10 x10*3/uL   Basic metabolic panel   Result Value Ref Range    Glucose 103 (H) 65 - 99 mg/dL    Sodium 139 133 - " 145 mmol/L    Potassium 3.8 3.4 - 5.1 mmol/L    Chloride 102 97 - 107 mmol/L    Bicarbonate 26 24 - 31 mmol/L    Urea Nitrogen 31 (H) 8 - 25 mg/dL    Creatinine 2.80 (H) 0.40 - 1.60 mg/dL    eGFR 16 (L) >60 mL/min/1.73m*2    Calcium 11.7 (H) 8.5 - 10.4 mg/dL    Anion Gap 11 <=19 mmol/L   Creatine Kinase   Result Value Ref Range    Creatine Kinase 20 (L) 24 - 195 U/L   Urinalysis with Reflex Culture and Microscopic   Result Value Ref Range    Color, Urine Light-Yellow Light-Yellow, Yellow, Dark-Yellow    Appearance, Urine Clear Clear    Specific Gravity, Urine 1.015 1.005 - 1.035    pH, Urine 6.0 5.0, 5.5, 6.0, 6.5, 7.0, 7.5, 8.0    Protein, Urine 10 (TRACE) NEGATIVE, 10 (TRACE), 20 (TRACE) mg/dL    Glucose, Urine Normal Normal mg/dL    Blood, Urine NEGATIVE NEGATIVE    Ketones, Urine NEGATIVE NEGATIVE mg/dL    Bilirubin, Urine NEGATIVE NEGATIVE    Urobilinogen, Urine Normal Normal mg/dL    Nitrite, Urine NEGATIVE NEGATIVE    Leukocyte Esterase, Urine NEGATIVE NEGATIVE   Extra Urine Gray Tube   Result Value Ref Range    Extra Tube Hold for add-ons.    Urinalysis Microscopic   Result Value Ref Range    WBC, Urine 1-5 1-5, NONE /HPF    RBC, Urine NONE NONE, 1-2, 3-5 /HPF    Mucus, Urine FEW Reference range not established. /LPF    Hyaline Casts, Urine OCCASIONAL (A) NONE /LPF   ECG 12 lead   Result Value Ref Range    Ventricular Rate 72 BPM    Atrial Rate 72 BPM    OH Interval 176 ms    QRS Duration 92 ms    QT Interval 414 ms    QTC Calculation(Bazett) 453 ms    P Axis 77 degrees    R Axis -61 degrees    T Axis 63 degrees    QRS Count 12 beats    Q Onset 212 ms    P Onset 124 ms    P Offset 176 ms    T Offset 419 ms    QTC Fredericia 440 ms   Comprehensive metabolic panel   Result Value Ref Range    Glucose 91 65 - 99 mg/dL    Sodium 142 133 - 145 mmol/L    Potassium 3.6 3.4 - 5.1 mmol/L    Chloride 105 97 - 107 mmol/L    Bicarbonate 28 24 - 31 mmol/L    Urea Nitrogen 31 (H) 8 - 25 mg/dL    Creatinine 2.90 (H) 0.40 -  1.60 mg/dL    eGFR 15 (L) >60 mL/min/1.73m*2    Calcium 10.4 8.5 - 10.4 mg/dL    Albumin 3.2 (L) 3.5 - 5.0 g/dL    Alkaline Phosphatase 67 35 - 125 U/L    Total Protein 5.4 (L) 5.9 - 7.9 g/dL    AST 10 5 - 40 U/L    Bilirubin, Total 0.4 0.1 - 1.2 mg/dL    ALT <5 (L) 5 - 40 U/L    Anion Gap 9 <=19 mmol/L   CBC and Auto Differential   Result Value Ref Range    WBC 2.0 (L) 4.4 - 11.3 x10*3/uL    nRBC 0.0 0.0 - 0.0 /100 WBCs    RBC 3.17 (L) 4.00 - 5.20 x10*6/uL    Hemoglobin 10.5 (L) 12.0 - 16.0 g/dL    Hematocrit 32.2 (L) 36.0 - 46.0 %     (H) 80 - 100 fL    MCH 33.1 26.0 - 34.0 pg    MCHC 32.6 32.0 - 36.0 g/dL    RDW 14.3 11.5 - 14.5 %    Platelets 169 150 - 450 x10*3/uL    Neutrophils % 55.6 40.0 - 80.0 %    Immature Granulocytes %, Automated 0.0 0.0 - 0.9 %    Lymphocytes % 29.9 13.0 - 44.0 %    Monocytes % 9.0 2.0 - 10.0 %    Eosinophils % 4.5 0.0 - 6.0 %    Basophils % 1.0 0.0 - 2.0 %    Neutrophils Absolute 1.12 (L) 1.60 - 5.50 x10*3/uL    Immature Granulocytes Absolute, Automated 0.00 0.00 - 0.50 x10*3/uL    Lymphocytes Absolute 0.60 (L) 0.80 - 3.00 x10*3/uL    Monocytes Absolute 0.18 0.05 - 0.80 x10*3/uL    Eosinophils Absolute 0.09 0.00 - 0.40 x10*3/uL    Basophils Absolute 0.02 0.00 - 0.10 x10*3/uL   Iron and TIBC   Result Value Ref Range    Iron 74 30 - 160 ug/dL    UIBC 159 110 - 370 ug/dL    TIBC 233 228 - 428 ug/dL    % Saturation 32 12 - 50 %   Ferritin   Result Value Ref Range    Ferritin 154 (H) 13 - 150 ng/mL   PTH, Intact   Result Value Ref Range    Parathyroid Hormone, Intact 10.0 (L) 18.5 - 88.0 pg/mL   Calcium, ionized   Result Value Ref Range    POCT Calcium, Ionized 1.46 (H) 1.1 - 1.33 mmol/L   TSH   Result Value Ref Range    Thyroid Stimulating Hormone 1.45 0.27 - 4.20 mIU/L        Assessment/Plan   89-year-old pleasant  female with history of CKD, hypertension and COPD, admitted following a mechanical fall at home.  Noted to have abnormal kidney function associated with  hypercalcemia.    GILBERT vs. CKD  Hypertension  Hypercalcemia  Anemia    She is aware of diagnosis of CKD but her baseline SCr is unknown as she recently moved to [facility] from [place].  It is unclear if dealing with GILBERT on CKD vs. advanced CKD at this time.  However, I suspect there is some component of GILBERT in the setting of volume depletion.  UA was negative for blood and only had trace proteinuria.  Renal ultrasound showed small echogenic kidneys bilaterally, suggesting underlying nephrosclerosis.    Serum calcium was 11.7 mg/dL at presentation (serum albumin was not checked for correction), but corrected calcium was down to 11.0 mg/dL this morning following overnight volume expansion.  Etiology of hypercalcemia is unclear but may be partly related to volume depletion.  PTH was appropriately suppressed and we will check PTH-related peptide, vitamin D levels.  Will also screen for monoclonal gammopathy.    In the meantime, I will leave her on NS at 75 mL/h for another day.    Dose meds for GFR around 15 mL/min and avoid potential nephrotoxins as much as possible.    Thank you for the opportunity to participate in her care.      Keyur Arciniega MD

## 2024-07-22 NOTE — CARE PLAN
The patient's goals for the shift include      The clinical goals for the shift include Maintain safety      Problem: Skin  Goal: Decreased wound size/increased tissue granulation at next dressing change  7/22/2024 0345 by Edita Peraza RN  Outcome: Progressing  7/22/2024 0320 by Edita Peraza RN  Outcome: Progressing  Goal: Participates in plan/prevention/treatment measures  7/22/2024 0345 by Edita Peraza RN  Outcome: Progressing  7/22/2024 0320 by Edita Peraza RN  Outcome: Progressing  Goal: Prevent/manage excess moisture  7/22/2024 0345 by Edita Peraza RN  Outcome: Progressing  7/22/2024 0320 by Edita Peraza RN  Outcome: Progressing  Goal: Prevent/minimize sheer/friction injuries  7/22/2024 0345 by Edita Peraza RN  Outcome: Progressing  7/22/2024 0320 by Edita Peraza RN  Outcome: Progressing  Goal: Promote/optimize nutrition  7/22/2024 0345 by Edita Peraza RN  Outcome: Progressing  7/22/2024 0320 by Edita Peraza RN  Outcome: Progressing  Goal: Promote skin healing  7/22/2024 0345 by Edita Peraza RN  Outcome: Progressing  7/22/2024 0320 by Edita Peraza RN  Outcome: Progressing     Problem: Fall/Injury  Goal: Not fall by end of shift  7/22/2024 0345 by Edita Peraza RN  Outcome: Progressing  7/22/2024 0320 by Edita Peraza RN  Outcome: Progressing  Goal: Be free from injury by end of the shift  7/22/2024 0345 by Edita Peraza RN  Outcome: Progressing  7/22/2024 0320 by Edita Peraza RN  Outcome: Progressing  Goal: Verbalize understanding of personal risk factors for fall in the hospital  7/22/2024 0345 by Edita Peraza RN  Outcome: Progressing  7/22/2024 0320 by Edita Peraza RN  Outcome: Progressing  Goal: Verbalize understanding of risk factor reduction measures to prevent injury from fall in the home  7/22/2024 0345 by Edita Peraza RN  Outcome: Progressing  7/22/2024 0320 by Edita Peraza RN  Outcome:  Progressing  Goal: Use assistive devices by end of the shift  7/22/2024 0345 by Edita Peraza RN  Outcome: Progressing  7/22/2024 0320 by Edita Peraza RN  Outcome: Progressing  Goal: Pace activities to prevent fatigue by end of the shift  7/22/2024 0345 by Edita Peraza RN  Outcome: Progressing  7/22/2024 0320 by Edita Peraza RN  Outcome: Progressing

## 2024-07-22 NOTE — CONSULTS
"Nutrition Assessement Note    Nutrition Assessment    Reason for Assessment: Admission nursing screening (MST 3)    Spoke with pt at bedside. Pt did not bring dentures and requesting easy to chew diet. Pt reported to not like hospital food/ Ensure, encouraged pt to try to eat. Pt is willing to try magic cup TID.     Reason for Hospital Admission:  Marisela Whitlock is a 89 y.o. female who is admitted for GILBERT.     Past Medical History:   Diagnosis Date    COPD (chronic obstructive pulmonary disease) (Multi)       No past surgical history on file.    Nutrition History:  Food and Nutrient History: Pt reported decreased PO intake for > 1 month. Pt stated she did not bring dentures     Food Allergies/Intolerances:  None  GI Symptoms: None  Oral Problems: Chewing difficulty    Anthropometrics:  Ht: 157.5 cm (5' 2\"), Wt: 54.6 kg (120 lb 5.1 oz), BMI: 22             Weight Change:  Daily Weight  07/21/24 : 54.6 kg (120 lb 5.1 oz)     Weight History / % Weight Change: Pt reported 6-7# weight loss over 1 month due to decreased PO intake. Pt reported UBW of 120#             Nutrition Focused Physical Exam Findings:   Subcutaneous Fat Loss  Orbital Fat Pads: Mild-Moderate (slight dark circles and slight hollowing)  Buccal Fat Pads: Mild-Moderate (flat cheeks, minimal bounce)    Muscle Wasting  Temporalis: Mild-Moderate (slight depression)  Pectoralis (Clavicular Region): Mild-Moderate (some protrusion of clavicle)  Deltoid/Trapezius: Mild-Moderate (slight protrusion of acromion process)  Interosseous: Mild-Moderate (slightly depressed area between thumb and forefinger)              Nutrition Significant Labs:  Lab Results   Component Value Date    WBC 2.0 (L) 07/22/2024    HGB 10.5 (L) 07/22/2024    HCT 32.2 (L) 07/22/2024     07/22/2024    ALT <5 (L) 07/22/2024    AST 10 07/22/2024     07/22/2024    K 3.6 07/22/2024     07/22/2024    CREATININE 2.90 (H) 07/22/2024    BUN 31 (H) 07/22/2024    CO2 28 07/22/2024 "    TSH 1.45 07/22/2024     Nutrition Specific Medications:  amLODIPine, 10 mg, oral, Daily  heparin (porcine), 5,000 Units, subcutaneous, q8h JAMES  pantoprazole, 20 mg, oral, Daily before breakfast      sodium chloride 0.9%, 100 mL/hr, Last Rate: 100 mL/hr (07/22/24 0346)  sodium chloride 0.9%, 100 mL/hr, Last Rate: 100 mL/hr (07/22/24 0346)      Dietary Orders (From admission, onward)       Start     Ordered    07/21/24 2158  Adult diet Regular  Diet effective now        Comments: Neutropenic Diet   Question:  Diet type  Answer:  Regular    07/21/24 2158                  Estimated Needs:   Estimated Energy Needs  Total Energy Estimated Needs (kCal): 1638 kCal  Total Estimated Energy Need per Day (kCal/kg): 30 kCal/kg  Method for Estimating Needs: actual wt    Estimated Protein Needs  Total Protein Estimated Needs (g): 66 g  Total Protein Estimated Needs (g/kg): 1.2 g/kg  Method for Estimating Needs: actual wt    Estimated Fluid Needs  Total Fluid Estimated Needs (mL): 1638 mL  Method for Estimating Needs: 1 mL/kcal        Nutrition Diagnosis   Nutrition Diagnosis:  Malnutrition Diagnosis  Patient has Malnutrition Diagnosis: Yes  Diagnosis Status: New  Malnutrition Diagnosis: Severe malnutrition related to chronic disease or condition  As Evidenced by: moderate fat and muscle deficits, PO intake < 75% for > 1 month            Nutrition Interventions/Recommendations   Nutrition Interventions and Recommendations:    Nutrition Prescription:  Individualized Nutrition Prescription Provided for : 1638 kcals, 66 g protein via diet    Nutrition Interventions:   Food and/or Nutrient Delivery Interventions  Interventions: Meals and snacks, Medical food supplement  Meals and Snacks: General healthful diet  Goal: pt did not bring dentures, requesting easy to chew diet. Will change diet order to reflect  Medical Food Supplement: Modified food  Goal: magic cup TID to provide 290 kcals and 9g protein each    Education  Documentation  No documentation found.           Nutrition Monitoring and Evaluation   Monitoring/Evaluation:   Food/Nutrient Related History Monitoring  Monitoring and Evaluation Plan: Energy intake  Energy Intake: Estimated energy intake  Criteria: pt to consume >/= 75% estimated needs            Time Spent/Follow-up:   Follow Up  Time Spent (min): 30 minutes  Last Date of Nutrition Visit: 07/22/24  Nutrition Follow-Up Needed?: 3-5 days  Follow up Comment: 7/24/24

## 2024-07-22 NOTE — PROGRESS NOTES
Spiritual Care Visit    Clinical Encounter Type  Visited With: Patient  Routine Visit: Introduction  Continue Visiting: Yes         Values/Beliefs  Spiritual Requests During Hospitalization: Anointed today    Sacramental Encounters  Sacrament of Sick-Anointing: Anointed     Vlad Syed

## 2024-07-22 NOTE — CARE PLAN
Problem: Bathing  Goal: STG - Patient will bathe lower body with close supervision and set up  Outcome: Progressing     Problem: Dressings Lower Extremities  Goal: STG - Patient to complete lower body dressing with close supervision  Outcome: Progressing     Problem: Dressing Upper Extremities  Goal: LTG - Patient will complete upper body dressing independent  Outcome: Progressing     Problem: Grooming  Goal: STG - Patient completes grooming independent  Outcome: Progressing     Problem: Functional Mobility  Goal: Perform functional mobility a household distance with 2ww and close supervision  Outcome: Progressing     Problem: Toileting  Goal: STG - Patient will complete toileting tasks with 2ww and close supervision  Outcome: Progressing     Problem: OT Transfers  Goal: LTG - Patient will perform all functional transfers with close supervision and 2ww  Outcome: Progressing     Problem: Therapeutic Activity  Goal: Perform BUE exercise with close supervsion  Outcome: Progressing

## 2024-07-22 NOTE — PROGRESS NOTES
Occupational Therapy    Evaluation/Treatment    Patient Name: Marisela Whitlock  MRN: [id number]  : 1933  Today's Date: 24  Time Calculation  Start Time: 1300  Stop Time: 1330  Time Calculation (min): 30 min       Assessment:  OT Assessment: Referral received, chart reviewed, and evaluation complete.  Presents from home s/p fall.  Demonstrates impaired balance,  impaired safety awareness/judgment, and problem solving.  High falls risk.  Would benefit from acute OT services.  Recommend moderate intensity rehab upon discharge.  Prognosis: Good  Barriers to Discharge: Decreased caregiver support  Evaluation/Treatment Tolerance: Patient limited by fatigue  Medical Staff Made Aware: Yes  End of Session Communication: Bedside nurse  End of Session Patient Position: Up in chair, Alarm on    Plan:  Treatment Interventions: ADL retraining, Functional transfer training, Endurance training, Patient/family training, Equipment evaluation/education, Neuromuscular reeducation, Compensatory technique education  OT Frequency: 4 times per week  OT Discharge Recommendations: Moderate intensity level of continued care  OT Recommended Transfer Status: Assist of 1, Minimal assist  OT - OK to Discharge: Yes  Treatment Interventions: ADL retraining, Functional transfer training, Endurance training, Patient/family training, Equipment evaluation/education, Neuromuscular reeducation, Compensatory technique education    Subjective   Current Problem:  1. GILBERT (acute kidney injury) (CMS-HCC)        2. Hypercalcemia        3. Fall, initial encounter          General:   OT Received On: 24  General  Reason for Referral: decreased functional status  Referred By: Daniela Dasilva DO  Past Medical History Relevant to Rehab: anxiety, HTN, bilateral foot pain, PAD, hypothyroid, chronic cough,  Family/Caregiver Present: No  Prior to Session Communication: Bedside nurse  Patient Position Received: Up in chair, Alarm on  General Comment:  89 year old WF admitted from home with c/o fall and hit her head on the TV stand    Precautions:  Hearing/Visual Limitations: Hearing WFL, wears glasses  Medical Precautions: Fall precautions       Pain:  Pain Assessment  Pain Assessment: 0-10  0-10 (Numeric) Pain Score: 0 - No pain    Objective   Cognition:  Overall Cognitive Status: Impaired  Orientation Level: Oriented X4  Problem Solving: Exceptions to WFL  Safety/Judgement: Exceptions to WFL  Processing Speed: Delayed     Home Living:  Type of Home: House  Lives With: Adult children (live with her daughter)  Home Adaptive Equipment: Walker rolling or standard  Home Layout: One level  Home Access: Stairs to enter without rails  Entrance Stairs-Rails: None  Entrance Stairs-Number of Steps: 1  Bathroom Shower/Tub: Tub/shower unit  Bathroom Toilet: Standard  Bathroom Equipment: Grab bars in shower  Home Living Comments: Home alone when her daughter goes to work    Prior Function:  Level of [place]: Independent with ADLs and functional transfers  ADL Assistance: Independent  Homemaking Assistance: Needs assistance  Vocational: Retired  Hand Dominance: Right    IADL History:  Homemaking Responsibilities: No  Current License: No  Mode of Transportation: Family    ADL:  Eating Assistance: Independent  Grooming Assistance: Independent  Grooming Deficit:  (with set up)  Bathing Assistance: Moderate  UE Dressing Assistance: Minimal  LE Dressing Assistance: Moderate  Toileting Assistance with Device: Minimal  Functional Assistance: Moderate    Activities of Daily Living: Grooming  Grooming Level of Assistance: Close supervision, Setup  Grooming Where Assessed: Standing sinkside  Grooming Comments: washed hands    LE Dressing  LE Dressing: Yes  Sock Level of Assistance: Independent  LE Dressing Where Assessed: Chair    Toileting  Toileting Level of Assistance: Moderate assistance  Where Assessed: Toilet  Toileting Comments: decreased standing balance for clothes  management and transfer.  Poor safety awareness needed verbal/tactile cues for proper hand positioning on rails and walker.    Activity Tolerance:  Endurance: Tolerates less than 10 min exercise with changes in vital signs       Bed Mobility/Transfers: Bed Mobility  Bed Mobility: No    Transfers  Transfer: Yes  Transfer 1  Transfer From 1: Chair with arms to  Transfer to 1: Stand  Technique 1: Sit to stand  Transfer Device 1: Walker  Transfer Level of Assistance 1: Minimum assistance  Transfers 2  Transfer From 2: Stand to  Transfer to 2: Toilet  Technique 2: Stand to sit  Transfer Device 2: Walker  Transfer Level of Assistance 2: Minimum assistance  Transfers 3  Transfer From 3: Stand to  Transfer to 3: Bed  Technique 3: Stand to sit  Transfer Device 3: Walker  Transfer Level of Assistance 3: Minimum assistance      Functional Mobility:  Functional Mobility  Functional Mobility Performed: Yes  Functional Mobility 1  Surface 1: Level tile  Device 1: Rolling walker  Assistance 1: Moderate assistance  Comments 1: perform functional mobility to and from the bathroom with 2ww and moderate assist.  Impaired balance, and ability to use the 2ww.  Reaching out for furniture to stabilize herself. Had LOB multiple times.  Decreased safety awareness,  judgment and problem solving noted.  High falls risk. (Instructed in proper walker use, and positioning with fair results)  Sitting Balance:  Static Sitting Balance  Static Sitting-Balance Support: Feet supported  Static Sitting-Level of Assistance: Independent  Standing Balance:  Static Standing Balance  Static Standing-Balance Support: Bilateral upper extremity supported  Static Standing-Level of Assistance: Minimum assistance     Sensation:  Light Touch: No apparent deficits  Strength:  Strength Comments: FREDY 3/5     Coordination:  Movements are Fluid and Coordinated: Yes   Hand Function:  Hand Function  Gross Grasp: Functional  Coordination: Impaired    Outcome Measures:  [facility] Daily Activity  Putting on and taking off regular lower body clothing: A lot  Bathing (including washing, rinsing, drying): A lot  Putting on and taking off regular upper body clothing: A little  Toileting, which includes using toilet, bedpan or urinal: A lot  Taking care of personal grooming such as brushing teeth: A little  Eating Meals: None  Daily Activity - Total Score: 16      Goals:         Problem: Bathing  Goal: STG - Patient will bathe lower body with close supervision and set up  Outcome: Progressing     Problem: Dressings Lower Extremities  Goal: STG - Patient to complete lower body dressing with close supervision  Outcome: Progressing     Problem: Dressing Upper Extremities  Goal: LTG - Patient will complete upper body dressing independent  Outcome: Progressing     Problem: Grooming  Goal: STG - Patient completes grooming independent  Outcome: Progressing     Problem: Functional Mobility  Goal: Perform functional mobility a household distance with 2ww and close supervision  Outcome: Progressing     Problem: Toileting  Goal: STG - Patient will complete toileting tasks with 2ww and close supervision  Outcome: Progressing

## 2024-07-22 NOTE — PROGRESS NOTES
"Marisela Whitlock is a 89 y.o. female on day 1 of admission presenting with GILBERT (acute kidney injury) (CMS-[facility]).      Subjective   Patient reports she feels fine. Eatinb lunch when I saw her. Denies any pain.    She denies history of kidney problems, but then tells me that she thinks she has \"nephritis\". Doesn't know what kind or what her numbers are normally, says that her previous physician was in [place]. Doesn't think she was seeing a nephrologist previously.       Objective     Last Recorded Vitals  /61 (BP Location: Left arm, Patient Position: Sitting)   Pulse 80   Temp 36.4 °C (97.5 °F) (Oral)   Resp 17   Wt 54.6 kg (120 lb 5.1 oz)   SpO2 99%   Intake/Output last 3 Shifts:    Intake/Output Summary (Last 24 hours) at 7/22/2024 1318  Last data filed at 7/22/2024 0346  Gross per 24 hour   Intake 1006.67 ml   Output 240 ml   Net 766.67 ml       Admission Weight  Weight: 54.6 kg (120 lb 5.1 oz) (07/21/24 1824)    Daily Weight  07/21/24 : 54.6 kg (120 lb 5.1 oz)    Image Results  ECG 12 lead  Normal sinus rhythm  Left anterior fascicular block  Abnormal ECG  No previous ECGs available  Confirmed by Leonid Taylor ([id number]) on 7/22/2024 10:32:10 AM  US renal complete  Narrative: Interpreted By:  Dianna Peñaloza,   STUDY:  US RENAL COMPLETE; 7/22/2024 7:52 am      INDICATION:  Signs/Symptoms:R/O Hydronephrosis. Renal failure      COMPARISON:  None.      ACCESSION NUMBER(S):  [id number]      ORDERING CLINICIAN:  BRIAN TAYLOR      TECHNIQUE:  Grayscale and color Doppler imaging of the kidneys.      FINDINGS:  The right kidney measures 7.5 cm in length.  The left kidney measures 7.8 cm in length.  There is no shadowing calculus, hydronephrosis, or solid mass  identified. The renal cortical thickness is within normal range.  Renal cortical echogenicity is slightly increased when compared with  that of the liver.      Cursory evaluation of the urinary bladder shows bilateral ureteral  jets. Urinary bladder is not well " "distended.      Impression: No hydronephrosis of either kidney.      Small kidneys bilaterally with slight increase in renal cortical  echogenicity which may indicate medical renal disease.      MACRO:  None.      Signed by: Dianna Peñaloza 7/22/2024 8:04 AM  Dictation workstation:   [id number]      Physical Exam  General: alert, no diaphoresis, thin   HENT: mucous membranes moist, external ears normal, no rhinorrhea   Eyes: no icterus or injection, no discharge   Lungs: CTA BL   Heart: RRR, no LE edema BL   GI: abdomen soft, nontender, nondistended, BS present   MSK: no joint effusion or deformity   Skin: no rashes, erythema, or ecchymosis   Neuro: grossly normal cognition, motor strength, sensation      Relevant Results               Assessment/Plan        Mechanical fall with head trama  - CT head without acute findings. There is a meningioma noted  - PT/OT  - fall precautions    GILBERT VS CKD  - tells me she has history of \"nephritis\" but does not know much else about this. Unknown baseline labs as her previous lab work was apparently in [place].  - nephrology consulted  - holding nephrotoxic agents      Neutropenia  - incidental and does not appear to have any signs of infection. ID saw the patient, no further recommendations at this point    Hypercalcemia  - getting fluids, further lab work ordered and nephrology to see    Anemia  - awaiting labs    Hypothyroidism  - unknown home synthroid dose- will resume when we are able    Incidental meningioma on brain imaging  - neurology on consult    HTN  - ARB on hold due to renal function. Norvasc started and hydralazine PRN      DVT ppx              Principal Problem:    GILBERT (acute kidney injury) (CMS-[facility])  Active Problems:    Fall    Meningioma (Multi)    Hypercalcemia    Neutropenia (CMS-[facility])    Acute kidney injury (CMS-[facility])          Malnutrition Diagnosis Status: New  Malnutrition Diagnosis: Severe malnutrition related to chronic disease or condition  As Evidenced by: " moderate fat and muscle deficits, PO intake < 75% for > 1 month  I agree with the dietitian's malnutrition diagnosis.         Daniela Dasilva, DO

## 2024-07-22 NOTE — PROGRESS NOTES
07/22/24 1446   Current Planned Discharge Disposition   Current Planned Discharge Disposition Home H  ([facility])

## 2024-07-22 NOTE — CARE PLAN
The patient's goals for the shift include      The clinical goals for the shift include maintain safety    Problem: Skin  Goal: Decreased wound size/increased tissue granulation at next dressing change  7/22/2024 1709 by Giovany Negrete RN  Outcome: Progressing  7/22/2024 1709 by Giovany Negrete RN  Outcome: Progressing  Goal: Participates in plan/prevention/treatment measures  7/22/2024 1709 by Giovany Negrete RN  Outcome: Progressing  7/22/2024 1709 by Giovany Negrete RN  Outcome: Progressing  Goal: Prevent/manage excess moisture  7/22/2024 1709 by Giovany Negrete RN  Outcome: Progressing  7/22/2024 1709 by Giovany Negrete RN  Outcome: Progressing  Goal: Prevent/minimize sheer/friction injuries  7/22/2024 1709 by Giovany Negrete RN  Outcome: Progressing  7/22/2024 1709 by Giovany Negrete RN  Outcome: Progressing  Goal: Promote/optimize nutrition  7/22/2024 1709 by Giovany Negrete RN  Outcome: Progressing  7/22/2024 1709 by Giovany Negrete RN  Outcome: Progressing  Goal: Promote skin healing  7/22/2024 1709 by Giovany Negrete RN  Outcome: Progressing  7/22/2024 1709 by Giovany Negrete RN  Outcome: Progressing     Problem: Fall/Injury  Goal: Not fall by end of shift  7/22/2024 1709 by Giovany Negrete RN  Outcome: Progressing  7/22/2024 1709 by Giovany Negrete RN  Outcome: Progressing  Goal: Be free from injury by end of the shift  7/22/2024 1709 by Giovany Negrete RN  Outcome: Progressing  7/22/2024 1709 by Giovany Negrete RN  Outcome: Progressing  Goal: Verbalize understanding of personal risk factors for fall in the hospital  7/22/2024 1709 by Giovany Negrete RN  Outcome: Progressing  7/22/2024 1709 by Giovany Negrete RN  Outcome: Progressing  Goal: Verbalize understanding of risk factor reduction measures to prevent injury from fall in the home  7/22/2024 1709 by Giovany Negrete RN  Outcome: Progressing  7/22/2024 1709 by Giovany M Cales, RN  Outcome: Progressing  Goal: Use assistive devices by end of the shift  7/22/2024 1709 by  Giovany Negrete RN  Outcome: Progressing  7/22/2024 1709 by Giovany Negrete RN  Outcome: Progressing  Goal: Pace activities to prevent fatigue by end of the shift  7/22/2024 1709 by Giovany Negrete RN  Outcome: Progressing  7/22/2024 1709 by Giovany Negrete RN  Outcome: Progressing     Problem: Bathing  Goal: STG - Patient will bathe lower body with close supervision and set up  7/22/2024 1709 by Giovany Negrete RN  Outcome: Progressing  7/22/2024 1709 by Giovany Negrete RN  Outcome: Progressing     Problem: Dressings Lower Extremities  Goal: STG - Patient to complete lower body dressing with close supervision  7/22/2024 1709 by Giovany Negrete RN  Outcome: Progressing  7/22/2024 1709 by Giovany Negrete RN  Outcome: Progressing     Problem: Dressing Upper Extremities  Goal: LTG - Patient will complete upper body dressing independent  7/22/2024 1709 by Giovany Negrete RN  Outcome: Progressing  7/22/2024 1709 by Giovany Negrete RN  Outcome: Progressing     Problem: Grooming  Goal: STG - Patient completes grooming independent  7/22/2024 1709 by Giovany Negrete RN  Outcome: Progressing  7/22/2024 1709 by Giovany Negrete RN  Outcome: Progressing     Problem: Toileting  Goal: STG - Patient will complete toileting tasks with 2ww and close supervision  7/22/2024 1709 by Giovany Negrete RN  Outcome: Progressing  7/22/2024 1709 by Giovany Negrete RN  Outcome: Progressing     Problem: Pain - Adult  Goal: Verbalizes/displays adequate comfort level or baseline comfort level  7/22/2024 1709 by Giovany Negrete RN  Outcome: Progressing  7/22/2024 1709 by Giovany Negrete RN  Outcome: Progressing     Problem: Safety - Adult  Goal: Free from fall injury  7/22/2024 1709 by Giovany Negrete RN  Outcome: Progressing  7/22/2024 1709 by Giovany Negrete RN  Outcome: Progressing     Problem: Discharge Planning  Goal: Discharge to home or other facility with appropriate resources  7/22/2024 1709 by Giovany Negrete RN  Outcome: Progressing  7/22/2024 1709 by  Giovany Negrete RN  Outcome: Progressing     Problem: Chronic Conditions and Co-morbidities  Goal: Patient's chronic conditions and co-morbidity symptoms are monitored and maintained or improved  7/22/2024 1709 by Giovany Negrete RN  Outcome: Progressing  7/22/2024 1709 by Giovany Negrete RN  Outcome: Progressing

## 2024-07-22 NOTE — NURSING NOTE
AM shift assessment unchanged, pt is A&Ox3 in no acute distress, resting in bed with call light in reach, no new events/concerns this shift. Bed alarm on

## 2024-07-22 NOTE — H&P
History Of Present Illness        Marisela Whitlock is a 89 y.o. female presenting with Mechanical Fall at home.  Per verbal report from ED physician patient fell at home.  She hit her head on a TV stand.  Possibility she was laying on the ground for a few hours before her daughter found her.  Physician and patient denied any loss of consciousness.    Vital signs with the patient noted for Tmax 36.7, pulse rate 76, respiratory rate 16, /86.  Saturating 97% on room air.      The patient's ED diagnostic workup is noted for a related neutropenia of 2.5.  Patient is H&H is low normal at 11.8/35.5.  The patient's platelet count was normal at 181.  Patient's urinalysis was bland.  Patient's blood chemistry noted for elevated glucose of 103.  Patient's creatinine was 2.8.  BUN was 31.  Calcium was elevated 11.7.  CPK was 20.    CT of the brain without contrast noted for the following:    No acute intracranial hemorrhage or mass effect      Probable left supraclinoid 10 mm meningioma. MRI with contrast may be  considered on a nonemergent basis for further characterization as  clinically indicated.      Nonspecific white matter changes and parenchymal volume loss.      EKG today noted for normal sinus rhythm at 72 bpm  Left anterior fascicular block  QTc 453 ms      Apparently the patient is new to the area.  She established care with Dr. Deana Reddy at [facility] on June 18, 2024.  Dr. Reddy has requested records from outside facility which are still pending.       Past Medical History      Anxiety disorder  Hypertension  Bilateral foot pain  Peripheral artery disease  Hypothyroidism  Chronic cough  Impacted Cerumen of Right ear           Surgical History        No past surgical history on file.         Social History      She has no history on file for tobacco use, alcohol use, and drug use.        Family History      No family history on file.       Allergies      Patient has no known  "allergies.        Review of Systems    14-point ROS otherwise negative, as per HPI/Interval History.    General: No change in weight. No weakenss. No Fevers/Chills/Night Sweats   Skin: No skin/hair/nail changes. No rashes or sores.  Head:  No trauma. No Headache/nasuea/vomitting.   Eyes: No visual changes. No tearing. No itching.   Ears: No hearing loss. No tinnitus. No vertigo. No discharge.  Nose, Sinuses: No rhinorrhea, No nasal congestion. No epistaxis.  Mouth, Throat, Neck: No bleeding gums, hoarseness, sore throat or swollen neck  Cardiac: No palpitations. No GARRIDO. No PND. No Orthopnea.   Respiratory: No Shortness of Breath. No wheezing. No cough. No hemoptysis.   GI: No nausea/vomiting. No indigestion. No diarrhea. No constipation.   Extremities: No numbness or tingling. No paresthesias.   Urinary: No change in urinary frequency. No change in hesitancy. No hematuria. No incontinence.           Physical Exam        Constitutional:  Pleasant  Eyes: PERRL, EOMI,   ENMT: mucous membranes moist  Head/Neck: Neck supple, No JVD,   Respiratory/Thorax: Patent airways, CTAB,   Cardiovascular: Regular, rate and rhythm, no murmurs  Gastrointestinal: Soft, non-distended, +BS.  Musculoskeletal: ROM intact, no joint swelling, normal strength  Extremities: peripheral pulses intact; no edema  Neurological: Alert and Oriented x 3; no focal deficits; gross motor and sensation intact; CN II-XII intact. No asterixis.  Psychological: Appropriate mood and behavior  Skin: No lesions, No rashes.         Last Recorded Vitals  Blood pressure 147/86, pulse 76, temperature 36.7 °C (98.1 °F), temperature source Oral, resp. rate 16, height 1.575 m (5' 2\"), weight 54.6 kg (120 lb 5.1 oz), SpO2 97%.    Relevant Results    Lab Results   Component Value Date    WBC 2.5 (L) 07/21/2024    HGB 11.8 (L) 07/21/2024    HCT 35.5 (L) 07/21/2024     07/21/2024     07/21/2024       Lab Results   Component Value Date    GLUCOSE 103 (H) " "07/21/2024    CALCIUM 11.7 (H) 07/21/2024     07/21/2024    K 3.8 07/21/2024    CO2 26 07/21/2024     07/21/2024    BUN 31 (H) 07/21/2024    CREATININE 2.80 (H) 07/21/2024       No results found for: \"HGBA1C\"      CT head wo IV contrast    Result Date: 7/21/2024  Interpreted By:  Becki Bennett, STUDY: CT HEAD WO IV CONTRAST;  7/21/2024 7:19 pm   INDICATION: Signs/Symptoms:fall, AMS.   COMPARISON: None.   ACCESSION NUMBER(S): [id number]   ORDERING CLINICIAN: DEYANIRA NEELY   TECHNIQUE: Axial noncontrast CT images of the head.   FINDINGS: BRAIN PARENCHYMA: Gray-white matter interfaces are preserved. No mass effect or midline shift. Mild nonspecific right lung changes and parenchymal volume loss. Atherosclerotic calcifications noted. 10 mm calcified lesion extends superiorly from the left anterior clinoid process   HEMORRHAGE: No acute intracranial hemorrhage. VENTRICLES and EXTRA-AXIAL SPACES: The ventricles, sulci and basal cisterns enlarged, concordant with parenchymal volume loss. EXTRACRANIAL SOFT TISSUES: Within normal limits. PARANASAL SINUSES/MASTOIDS: The visualized paranasal sinuses and mastoid air cells are aerated. CALVARIUM: No depressed skull fracture. No destructive osseous lesion.   OTHER FINDINGS: None.       No acute intracranial hemorrhage or mass effect   Probable left supraclinoid 10 mm meningioma. MRI with contrast may be considered on a nonemergent basis for further characterization as clinically indicated.   Nonspecific white matter changes and parenchymal volume loss.   MACRO: None.   Signed by: Becki Bennett 7/21/2024 7:53 PM Dictation workstation:   [id number]       Scheduled medications     Continuous medications  sodium chloride 0.9%, 100 mL/hr      PRN medications          Assessment/Plan   Principal Problem:    GILBERT (acute kidney injury) (CMS-HCC)  Active Problems:    Fall    Meningioma (Multi)    Hypercalcemia    Neutropenia (CMS-HCC)      Marisela Whitlock is a 89 " y.o. female presenting with Mechanical Fall at home.  Per verbal report from ED physician patient fell at home.  She hit her head on a TV stand.  Possibility she was laying on the ground for a few hours before her daughter found her.  Physician and patient denied any loss of consciousness.          Mechanical Fall with Head trauma    Admit to [facility] w/o Contrast appreciated  Will request U/A  AM TSH Ordered   Fall precautions/Aspiration Precautions   Up with Assist   PT/OT Evaluation       Acute Renal Failure    Continue IVF hydration   Hold ACEI/ARB.  It appears new PCP recently switched her from lisinopril to losartan due to chronic cough.  Holding.  Continue to hold all Nephrotoxic agents  Obtain Renal/Bladder Sonogram to rule out hydronephrosis   Continue to Monitor Renal Function (BUN/Cr) + Urine Output.   Will consult our nephrologist      Incidental Neutropenia    Infectious disease consultation  Neutropenic diet      Hypercalcemia    Continue volume expansion with IVF  PTH levels + ionized calcium level ordered for a.m.  Nephrology consultation      Anemia      -- Follow up Anemia Work-up and labs  (Iron Profile, Ferritin, Transferrin, Retic Count, Vit. B12 + Folate)  -- Monitor H/H, Keep active T + S      Incidental finding of Meningioma      Neurology consultation requested to establish care  Defer MRI imaging to neurology      H/o Hypothyroidism    Uncertain what the patient's home levothyroxine dose is. Med list unavailable.  Follow-up morning TSH level      HTN    Continue to monitor BP and adjust hypertensive medications accordingly  Holding ARB in the setting of acute renal failure  Started patient on amlodipine  Hydralazine as needed      GI + DVT Prophylaxis      Low-dose oral PPI  Heparin subcu              This Dictation was Transcribed using a Nuance Dragon Voice Recognition System Device (with Compatible Computer + Software) and as such may contain Grammatical Errors and Unintentional Typing  Misprints.      I spent 32 minutes in the professional and overall care of this patient.      Levy Dave MD

## 2024-07-22 NOTE — CONSULTS
Inpatient consult to Infectious Diseases  Consult performed by: Bronson Howard MD  Consult ordered by: Levy Daev MD            Primary MD: GENERIC EXTERNAL DATA PROVIDER    Reason For Consult  Leukopenia    History Of Present Illness  Marisela Whitlock is a 89 y.o. female presenting with fall.  She sustained a fall on day of admission.  She hit her head on a TV stand.  She is reported to have been lying on the ground for a few hours before her daughter found her.  She was brought to the hospital for further evaluation and management.  CT of the brain was negative for stroke, but remarkable for meningioma.  I have been asked to see her because of low white blood cell count.  She reports that she has had low white blood cell count in the past.  She recently moved to [place].  She was informed by her previous primary care physician that she had leukopenia.  She reports having bone marrow biopsy many years ago which was negative for malignancy.  Workup was remarkable for acute kidney injury.     Past Medical History  She has a past medical history of COPD (chronic obstructive pulmonary disease) (Multi).    Surgical History  She has no past surgical history on file.     Social History     Occupational History    Not on file   Tobacco Use    Smoking status: Former     Current packs/day: 0.00     Types: Cigarettes     Quit date:      Years since quittin.5    Smokeless tobacco: Never   Substance and Sexual Activity    Alcohol use: Not on file    Drug use: Not on file    Sexual activity: Not on file     Travel History   Travel since 24    No documented travel since 24           Family History  No family history on file.  Allergies  Patient has no known allergies.       There is no immunization history on file for this patient.  Medications  Home medications:  No medications prior to admission.     Current medications:  Scheduled medications  amLODIPine, 10 mg, oral, Daily  heparin (porcine), 5,000  "Units, subcutaneous, q8h JAMES  pantoprazole, 20 mg, oral, Daily before breakfast      Continuous medications  sodium chloride 0.9%, 100 mL/hr, Last Rate: 100 mL/hr (07/22/24 0346)  sodium chloride 0.9%, 100 mL/hr, Last Rate: 100 mL/hr (07/22/24 0346)      PRN medications  PRN medications: acetaminophen **OR** acetaminophen **OR** acetaminophen, albuterol, benzocaine-menthol, dextromethorphan-guaifenesin, guaiFENesin, hydrALAZINE, ondansetron ODT **OR** ondansetron, polyethylene glycol    Review of Systems   Constitutional:  Negative for chills and fever.   Respiratory:  Negative for cough and shortness of breath.    Cardiovascular:  Negative for chest pain.   Gastrointestinal:  Negative for abdominal pain, constipation and diarrhea.   Genitourinary:  Negative for dysuria, frequency and hematuria.   All other systems reviewed and are negative.       Objective  Range of Vitals (last 24 hours)  Heart Rate:  [70-76]   Temp:  [36.3 °C (97.3 °F)-36.7 °C (98.1 °F)]   Resp:  [16-19]   BP: (135-220)/(73-98)   Height:  [157.5 cm (5' 2\")]   Weight:  [54.6 kg (120 lb 5.1 oz)]   SpO2:  [97 %-100 %]   Daily Weight  07/21/24 : 54.6 kg (120 lb 5.1 oz)    Body mass index is 22.01 kg/m².     Physical Exam  Constitutional:       Appearance: Normal appearance.   HENT:      Head: Normocephalic and atraumatic.      Nose: Nose normal.   Eyes:      General: No scleral icterus.     Extraocular Movements: Extraocular movements intact.      Conjunctiva/sclera: Conjunctivae normal.   Cardiovascular:      Rate and Rhythm: Normal rate and regular rhythm.   Pulmonary:      Effort: Pulmonary effort is normal.      Breath sounds: Normal breath sounds.   Abdominal:      General: Bowel sounds are normal.      Palpations: Abdomen is soft.   Musculoskeletal:      Cervical back: Normal range of motion and neck supple.      Right lower leg: No edema.      Left lower leg: No edema.   Skin:     General: Skin is warm and dry.   Neurological:      Mental " "Status: She is alert and oriented to person, place, and time.   Psychiatric:         Mood and Affect: Mood normal.         Behavior: Behavior normal.          Relevant Results  Outside Hospital Results    Labs  Results from last 72 hours   Lab Units 07/22/24 0526 07/21/24 2005   WBC AUTO x10*3/uL 2.0* 2.5*   HEMOGLOBIN g/dL 10.5* 11.8*   HEMATOCRIT % 32.2* 35.5*   PLATELETS AUTO x10*3/uL 169 181   NEUTROS PCT AUTO % 55.6 54.7   LYMPHS PCT AUTO % 29.9 29.4   MONOS PCT AUTO % 9.0 11.0   EOS PCT AUTO % 4.5 3.7     Results from last 72 hours   Lab Units 07/22/24 0526 07/21/24 2005   SODIUM mmol/L 142 139   POTASSIUM mmol/L 3.6 3.8   CHLORIDE mmol/L 105 102   CO2 mmol/L 28 26   BUN mg/dL 31* 31*   CREATININE mg/dL 2.90* 2.80*   GLUCOSE mg/dL 91 103*   CALCIUM mg/dL 10.4 11.7*   ANION GAP mmol/L 9 11   EGFR mL/min/1.73m*2 15* 16*     Results from last 72 hours   Lab Units 07/22/24 0526   ALK PHOS U/L 67   BILIRUBIN TOTAL mg/dL 0.4   PROTEIN TOTAL g/dL 5.4*   ALT U/L <5*   AST U/L 10   ALBUMIN g/dL 3.2*     Estimated Creatinine Clearance: 10.2 mL/min (A) (by C-G formula based on SCr of 2.9 mg/dL (H)).  No results found for: \"CRP\", \"SEDRATE\"  No results found for: \"HIV1X2\", \"HIVCONF\", \"JUTQNQ2FD\"  No results found for: \"HEPCABINIT\", \"HEPCAB\", \"HCVPCRQUANT\"  Microbiology  Reviewed-negative UA with regards to UTI  Imaging  ECG 12 lead    Result Date: 7/22/2024  Normal sinus rhythm Left anterior fascicular block Abnormal ECG No previous ECGs available Confirmed by Leonid Taylor ([id number]) on 7/22/2024 10:32:10 AM    US renal complete    Result Date: 7/22/2024  Interpreted By:  Dianna Peñaloza, STUDY: US RENAL COMPLETE; 7/22/2024 7:52 am   INDICATION: Signs/Symptoms:R/O Hydronephrosis. Renal failure   COMPARISON: None.   ACCESSION NUMBER(S): [id number]   ORDERING CLINICIAN: BRIAN TAYLOR   TECHNIQUE: Grayscale and color Doppler imaging of the kidneys.   FINDINGS: The right kidney measures 7.5 cm in length. The left kidney " measures 7.8 cm in length. There is no shadowing calculus, hydronephrosis, or solid mass identified. The renal cortical thickness is within normal range. Renal cortical echogenicity is slightly increased when compared with that of the liver.   Cursory evaluation of the urinary bladder shows bilateral ureteral jets. Urinary bladder is not well distended.       No hydronephrosis of either kidney.   Small kidneys bilaterally with slight increase in renal cortical echogenicity which may indicate medical renal disease.   MACRO: None.   Signed by: Dianna Peñaloza 7/22/2024 8:04 AM Dictation workstation:   [id number]    CT head wo IV contrast    Result Date: 7/21/2024  Interpreted By:  Becki Bennett, STUDY: CT HEAD WO IV CONTRAST;  7/21/2024 7:19 pm   INDICATION: Signs/Symptoms:fall, AMS.   COMPARISON: None.   ACCESSION NUMBER(S): [id number]   ORDERING CLINICIAN: DEYANIRA NEELY   TECHNIQUE: Axial noncontrast CT images of the head.   FINDINGS: BRAIN PARENCHYMA: Gray-white matter interfaces are preserved. No mass effect or midline shift. Mild nonspecific right lung changes and parenchymal volume loss. Atherosclerotic calcifications noted. 10 mm calcified lesion extends superiorly from the left anterior clinoid process   HEMORRHAGE: No acute intracranial hemorrhage. VENTRICLES and EXTRA-AXIAL SPACES: The ventricles, sulci and basal cisterns enlarged, concordant with parenchymal volume loss. EXTRACRANIAL SOFT TISSUES: Within normal limits. PARANASAL SINUSES/MASTOIDS: The visualized paranasal sinuses and mastoid air cells are aerated. CALVARIUM: No depressed skull fracture. No destructive osseous lesion.   OTHER FINDINGS: None.       No acute intracranial hemorrhage or mass effect   Probable left supraclinoid 10 mm meningioma. MRI with contrast may be considered on a nonemergent basis for further characterization as clinically indicated.   Nonspecific white matter changes and parenchymal volume loss.   MACRO: None.    Signed by: Becki Bennett 7/21/2024 7:53 PM Dictation workstation:   [id number]    Assessment/Plan   Leukopenia-absolute neutrophil count greater than 1000  Acute renal failure syndrome    No indication for neutropenic precautions  Monitor absolute neutrophil count  Monitor renal function  Supportive care  Monitor temperature      Bronson Howard MD

## 2024-07-22 NOTE — PROGRESS NOTES
Physical Therapy    Physical Therapy Evaluation & Treatment    Patient Name: Marisela Whitlock  MRN: [id number]  Today's Date: 7/22/2024   Time Calculation  Start Time: 0915  Stop Time: 0952  Time Calculation (min): 37 min    Assessment/Plan   PT Assessment  PT Assessment Results: Decreased strength, Decreased mobility, Impaired hearing, Decreased safety awareness, Decreased range of motion (?impaired cognition with respect to mild word finding deficit & increased processing time)  Rehab Prognosis: Good  Strengths: Support of extended family/friends, Premorbid level of function, Housing layout  Barriers to Participation: Comorbidities  End of Session Communication: Bedside nurse (during session re: chair alarm activated & 1 person assist)  Assessment Comment: She presented with decreased muscular strength/endurance, likely impaired cognition & decreased safety awareness. Pt required increased assist during functional mobility compared to their reported baseline. Pt would benefit from continued skilled PT services for maximizing independence and safety prior to & after discharge (moderate intensity).  End of Session Patient Position: Up in chair, Alarm on (call light & tray table within reach; granddtr in room)   IP OR SWING BED PT PLAN  Inpatient or Swing Bed: Inpatient  PT Plan  Treatment/Interventions: Bed mobility, Transfer training, Stair training, Gait training, Strengthening, Therapeutic exercise, Therapeutic activity  PT Plan: Ongoing PT  PT Frequency: 4 times per week  PT Discharge Recommendations: Moderate intensity level of continued care  PT Recommended Transfer Status: Assist x1, Assistive device (FWW)  PT - OK to Discharge: Yes      Subjective     General Visit Information:  General  Reason for Referral: Impaired functional mobility. This 89 y.o. female presented to the ED after a mechanical fall with head injury without loss of consciousness. CT brain (-) for acute processes but with probable L  meninigioma. She was admitted for GILBERT, fall wth head trauma, meningioma, hypercalcemia & neutropenia  Past Medical History Relevant to Rehab: anxiety, HTN, PAD  Family/Caregiver Present: Yes (granddtr arrived near end of session)  Prior to Session Communication: Bedside nurse (RN cleared pt for participation.)  Patient Position Received: Bed, 2 rail up, Alarm off, not on at start of session  General Comment: Pt agreed to session & was fully engaged throughout,    Home Living:  Type of Home: House  Lives With: Adult children (dtr, an RN who works 2 days per week)  Home Adaptive Equipment:  FWW & Rollator walker  Home Layout: One level  Home Access:  1 threshold step with grab bar at front door & without grab bar at back door  Bathroom Shower/Tub: Tub/shower unit  Bathroom Equipment: Grab bars in shower  Home Living Comments: Home alone at times. Ivana lives nearby.    Prior Level of Function:  Prior Function Per Pt Report  Receives Help From: Family  ADL Assistance: Independent  Homemaking Assistance: Needs assistance  Ambulatory Assistance: Needs assistance  Transfers:  Mod I  Gait:  Mod I with Rollator at home & FWW in community  Stairs:  with grab bar & assist (likely Heidi x1)  Prior Function Comments: (-) driving, dtr provided transport    Precautions:  Medical Precautions: Fall precautions    Vital Signs:  Heart Rate: 78  Heart Rate Source:  (finger tip pulse-oximeter)  SpO2: 97 %  Patient Position: Sitting    Objective   Pain:  Pain Assessment: 0-10  0-10 (Numeric) Pain Score: 8  Pain Type: Acute pain  Pain Location: Head (headache)  Pain Interventions:  RN notified during session    Cognition:  Overall Cognitive Status: Within Functional Limits  Orientation Level: Oriented X4  Cognition Comments: Noted mild word finding difficulty, appeared to be related to processing speed  Processing Speed: Delayed (mainly when answering questions)    General Assessments:  Activity Tolerance  Endurance: Tolerates 10 - 20  min exercise with multiple rests    Sensation  Light Touch: Not tested (denied paresthesias)    ROM/Strength  BLE AROM: WFL except DF to ~5°  Strength Comments: based on observation of mobiliy,grossly >/=3+/5    Perception  Motor Planning: Appears intact    Coordination  Movements are Fluid and Coordinated: Yes    Postural Control  Within Functional Limits  Posture Comment: mild thoracic kyphosis    Static Sitting Balance  Static Sitting-Balance Support: Feet supported, No upper extremity supported  Static Sitting-Level of Assistance: Modified independent  Dynamic Sitting Balance  Dynamic Sitting-Balance Support: Feet supported (unilateral LE supported)  Dynamic Sitting-Comments: Distant S    Static Standing Balance  Static Standing-Balance Support: Bilateral upper extremity supported  Static Standing-Level of Assistance: Close supervision  Dynamic Standing Balance  Dynamic Standing-Balance Support: Bilateral upper extremity supported  Dynamic Standing-Comments:  (Min/modA x1)    Functional Assessments:  Bed Mobility  Bed Mobility 1: Supine to sitting  Level of Assistance 1: Close supervision (increased time/effort to complete)  Bed Mobility Comments 1: HOB elevated & used L bed rail    Transfers  Technique 1: Sit to stand  Transfer Device 1: Walker (grab bars in restroom)  Transfer Level of Assistance 1: Minimum assistance for walker safety/BUE placement & steadying. Verbal/aural cues for BUE placement in start position  Trials/Comments 1: x2 trials: EOB & toilet    Technique 2: Stand to sit  Transfer Device 2: Walker (grab bars or armrests)  Transfer Level of Assistance 2: Contact guard. Verbally/tactile/aural cues for alignment with sitting surface & safe BUE placement.  Trials/Comments 2: x2 trials: toilet & bedside chair    Ambulation/Gait Training  Surface 1: Level tile  Device 1: Rolling walker  Assistance 1:  Min/modA x1 for walker management & guarding at trunk. Maximal verbal/visual/tactile cues for  keeping GEMA within frame of walker. Also cued for increased bilateral step length, increased trunk/cerical ext & fwd gaze.  Quality of Gait 1: Decreased step length, Narrow base of support, Diminished heel strike  Comments/Distance 1: 12 ft x2 using minimal step through pattern with bilateral foot flat initial contact. Slowed velocity with continuous movement on straight paths. Steady gait.     Treatments:  Therapeutic Exercise  Seated EOB, BLE x10 each. Cued as needed for proper form to obtain maximal benefits.  -DF/PF  -knee ext  -hip abd  -hip flex    Therapeutic Activity  See above for 2nd trials of sit<>stand & gait training, as well as cues during functional mobility. Pt instructed in fall safety precautions: use of call light for nursing staff assist prior to mobility within her room.    Outcome Measures:  [facility] Basic Mobility  Turning from your back to your side while in a flat bed without using bedrails: A little  Moving from lying on your back to sitting on the side of a flat bed without using bedrails: A lot  Moving to and from bed to chair (including a wheelchair): A little  Standing up from a chair using your arms (e.g. wheelchair or bedside chair): A little  To walk in hospital room: A lot  Climbing 3-5 steps with railing: A lot  Basic Mobility - Total Score: 15    Encounter Problems       Encounter Problems (Active)       Functional Mobility       Pt will transition supine<>sit with mod I.       Start:  07/22/24    Expected End:  08/18/24            Pt will transfer sit<>stand with FWW & mod I.       Start:  07/22/24    Expected End:  08/18/24            Pt will ambulate >/=100 ft with FWW & mod I.       Start:  07/22/24    Expected End:  08/18/24            Pt will negotiate 1 stair with hand rail to mimic grab bar & Heidi x1.       Start:  07/22/24    Expected End:  08/18/24                   Education Documentation  Mobility Training, taught by Leandra Sood PT at 7/22/2024 12:41 PM.  Learner:  Patient  Readiness: Acceptance  Method: Explanation  Response: Verbalizes Understanding, Needs Reinforcement    Education Comments  No comments found.

## 2024-07-23 ENCOUNTER — APPOINTMENT (OUTPATIENT)
Dept: RADIOLOGY | Facility: HOSPITAL | Age: 89
DRG: 682 | End: 2024-07-23
Payer: MEDICARE

## 2024-07-23 LAB
ALBUMIN SERPL-MCNC: 3.1 G/DL (ref 3.5–5)
ANION GAP SERPL CALC-SCNC: 10 MMOL/L
BUN SERPL-MCNC: 26 MG/DL (ref 8–25)
CALCIUM SERPL-MCNC: 9.4 MG/DL (ref 8.5–10.4)
CHLORIDE SERPL-SCNC: 111 MMOL/L (ref 97–107)
CO2 SERPL-SCNC: 23 MMOL/L (ref 24–31)
CREAT SERPL-MCNC: 2.5 MG/DL (ref 0.4–1.6)
EGFRCR SERPLBLD CKD-EPI 2021: 18 ML/MIN/1.73M*2
ERYTHROCYTE [DISTWIDTH] IN BLOOD BY AUTOMATED COUNT: 14.3 % (ref 11.5–14.5)
GLUCOSE SERPL-MCNC: 94 MG/DL (ref 65–99)
HCT VFR BLD AUTO: 32.5 % (ref 36–46)
HGB BLD-MCNC: 10.4 G/DL (ref 12–16)
MCH RBC QN AUTO: 32.8 PG (ref 26–34)
MCHC RBC AUTO-ENTMCNC: 32 G/DL (ref 32–36)
MCV RBC AUTO: 103 FL (ref 80–100)
NRBC BLD-RTO: 0 /100 WBCS (ref 0–0)
PHOSPHATE SERPL-MCNC: 3.2 MG/DL (ref 2.5–4.5)
PLATELET # BLD AUTO: 167 X10*3/UL (ref 150–450)
POTASSIUM SERPL-SCNC: 3.7 MMOL/L (ref 3.4–5.1)
RBC # BLD AUTO: 3.17 X10*6/UL (ref 4–5.2)
SODIUM SERPL-SCNC: 144 MMOL/L (ref 133–145)
WBC # BLD AUTO: 2.2 X10*3/UL (ref 4.4–11.3)

## 2024-07-23 PROCEDURE — 2500000004 HC RX 250 GENERAL PHARMACY W/ HCPCS (ALT 636 FOR OP/ED): Performed by: INTERNAL MEDICINE

## 2024-07-23 PROCEDURE — 2500000001 HC RX 250 WO HCPCS SELF ADMINISTERED DRUGS (ALT 637 FOR MEDICARE OP): Performed by: INTERNAL MEDICINE

## 2024-07-23 PROCEDURE — 80069 RENAL FUNCTION PANEL: CPT | Performed by: HOSPITALIST

## 2024-07-23 PROCEDURE — 2500000001 HC RX 250 WO HCPCS SELF ADMINISTERED DRUGS (ALT 637 FOR MEDICARE OP): Performed by: HOSPITALIST

## 2024-07-23 PROCEDURE — 70551 MRI BRAIN STEM W/O DYE: CPT | Performed by: RADIOLOGY

## 2024-07-23 PROCEDURE — 1210000001 HC SEMI-PRIVATE ROOM DAILY

## 2024-07-23 PROCEDURE — 36415 COLL VENOUS BLD VENIPUNCTURE: CPT | Performed by: HOSPITALIST

## 2024-07-23 PROCEDURE — 85027 COMPLETE CBC AUTOMATED: CPT | Performed by: HOSPITALIST

## 2024-07-23 PROCEDURE — 2500000002 HC RX 250 W HCPCS SELF ADMINISTERED DRUGS (ALT 637 FOR MEDICARE OP, ALT 636 FOR OP/ED): Performed by: INTERNAL MEDICINE

## 2024-07-23 PROCEDURE — 70551 MRI BRAIN STEM W/O DYE: CPT

## 2024-07-23 PROCEDURE — 2500000002 HC RX 250 W HCPCS SELF ADMINISTERED DRUGS (ALT 637 FOR MEDICARE OP, ALT 636 FOR OP/ED): Performed by: HOSPITALIST

## 2024-07-23 PROCEDURE — 96372 THER/PROPH/DIAG INJ SC/IM: CPT | Performed by: INTERNAL MEDICINE

## 2024-07-23 RX ADMIN — PRASUGREL 10 MG: 10 TABLET, FILM COATED ORAL at 09:00

## 2024-07-23 RX ADMIN — ASPIRIN 81 MG: 81 TABLET, COATED ORAL at 09:06

## 2024-07-23 RX ADMIN — AMLODIPINE BESYLATE 10 MG: 10 TABLET ORAL at 09:06

## 2024-07-23 RX ADMIN — ALPRAZOLAM 0.25 MG: 0.25 TABLET ORAL at 20:14

## 2024-07-23 RX ADMIN — HEPARIN SODIUM 5000 UNITS: 5000 INJECTION, SOLUTION INTRAVENOUS; SUBCUTANEOUS at 06:28

## 2024-07-23 RX ADMIN — LEVOTHYROXINE SODIUM 75 MCG: 0.07 TABLET ORAL at 06:28

## 2024-07-23 RX ADMIN — HEPARIN SODIUM 5000 UNITS: 5000 INJECTION, SOLUTION INTRAVENOUS; SUBCUTANEOUS at 20:13

## 2024-07-23 RX ADMIN — SODIUM CHLORIDE 75 ML/HR: 900 INJECTION, SOLUTION INTRAVENOUS at 17:00

## 2024-07-23 RX ADMIN — MIRTAZAPINE 7.5 MG: 15 TABLET, FILM COATED ORAL at 20:14

## 2024-07-23 RX ADMIN — PANTOPRAZOLE SODIUM 20 MG: 20 TABLET, DELAYED RELEASE ORAL at 06:28

## 2024-07-23 RX ADMIN — HEPARIN SODIUM 5000 UNITS: 5000 INJECTION, SOLUTION INTRAVENOUS; SUBCUTANEOUS at 14:54

## 2024-07-23 RX ADMIN — SODIUM CHLORIDE 75 ML/HR: 900 INJECTION, SOLUTION INTRAVENOUS at 02:34

## 2024-07-23 ASSESSMENT — COGNITIVE AND FUNCTIONAL STATUS - GENERAL
HELP NEEDED FOR BATHING: A LITTLE
CLIMB 3 TO 5 STEPS WITH RAILING: A LOT
DAILY ACTIVITIY SCORE: 19
PERSONAL GROOMING: A LITTLE
MOVING FROM LYING ON BACK TO SITTING ON SIDE OF FLAT BED WITH BEDRAILS: A LITTLE
MOVING TO AND FROM BED TO CHAIR: A LITTLE
TOILETING: A LITTLE
TURNING FROM BACK TO SIDE WHILE IN FLAT BAD: A LITTLE
DRESSING REGULAR LOWER BODY CLOTHING: A LITTLE
DRESSING REGULAR UPPER BODY CLOTHING: A LITTLE
STANDING UP FROM CHAIR USING ARMS: A LITTLE
WALKING IN HOSPITAL ROOM: A LOT
MOBILITY SCORE: 16

## 2024-07-23 ASSESSMENT — PAIN SCALES - GENERAL
PAINLEVEL_OUTOF10: 0 - NO PAIN
PAINLEVEL_OUTOF10: 0 - NO PAIN

## 2024-07-23 ASSESSMENT — PAIN - FUNCTIONAL ASSESSMENT: PAIN_FUNCTIONAL_ASSESSMENT: FLACC (FACE, LEGS, ACTIVITY, CRY, CONSOLABILITY)

## 2024-07-23 NOTE — PROGRESS NOTES
Marisela Whitlock is a 89 y.o. female on day 1 of admission presenting with GILBERT (acute kidney injury) (CMS-[facility]).      Subjective   No new overnight events.  Elevated BP noted.  No dyspnea at rest.  Decent urine volumes       Scheduled medications  amLODIPine, 10 mg, oral, Daily  aspirin, 81 mg, oral, Daily  heparin (porcine), 5,000 Units, subcutaneous, q8h JAMES  levothyroxine, 75 mcg, oral, Daily  mirtazapine, 7.5 mg, oral, Nightly  pantoprazole, 20 mg, oral, Daily before breakfast  prasugrel, 10 mg, oral, Daily      Continuous medications  sodium chloride 0.9%, 75 mL/hr, Last Rate: 75 mL/hr (07/23/24 0234)      PRN medications  PRN medications: acetaminophen **OR** acetaminophen **OR** acetaminophen, albuterol, ALPRAZolam, benzocaine-menthol, dextromethorphan-guaifenesin, guaiFENesin, hydrALAZINE, ondansetron ODT **OR** ondansetron, polyethylene glycol      Objective     Vitals 24HR  Heart Rate:  [74-81]   Temp:  [36.2 °C (97.2 °F)-36.7 °C (98.1 °F)]   Resp:  [13-17]   BP: (108-137)/(48-63)   SpO2:  [98 %-100 %]     General: Frail elderly woman, not in distress  Lungs: Clear bilaterally  Heart: S1 and S2, regular  Abdomen: Soft, nontender  Extremities: No pedal edema  Neuro: Awake and interactive    Intake/Output last 3 Shifts:    Intake/Output Summary (Last 24 hours) at 7/23/2024 1648  Last data filed at 7/22/2024 2153  Gross per 24 hour   Intake 388.75 ml   Output 150 ml   Net 238.75 ml       Relevant Results    Results for orders placed or performed during the hospital encounter of 07/21/24 (from the past 24 hour(s))   Renal Function Panel   Result Value Ref Range    Glucose 94 65 - 99 mg/dL    Sodium 144 133 - 145 mmol/L    Potassium 3.7 3.4 - 5.1 mmol/L    Chloride 111 (H) 97 - 107 mmol/L    Bicarbonate 23 (L) 24 - 31 mmol/L    Urea Nitrogen 26 (H) 8 - 25 mg/dL    Creatinine 2.50 (H) 0.40 - 1.60 mg/dL    eGFR 18 (L) >60 mL/min/1.73m*2    Calcium 9.4 8.5 - 10.4 mg/dL    Phosphorus 3.2 2.5 - 4.5 mg/dL    Albumin  3.1 (L) 3.5 - 5.0 g/dL    Anion Gap 10 <=19 mmol/L   CBC   Result Value Ref Range    WBC 2.2 (L) 4.4 - 11.3 x10*3/uL    nRBC 0.0 0.0 - 0.0 /100 WBCs    RBC 3.17 (L) 4.00 - 5.20 x10*6/uL    Hemoglobin 10.4 (L) 12.0 - 16.0 g/dL    Hematocrit 32.5 (L) 36.0 - 46.0 %     (H) 80 - 100 fL    MCH 32.8 26.0 - 34.0 pg    MCHC 32.0 32.0 - 36.0 g/dL    RDW 14.3 11.5 - 14.5 %    Platelets 167 150 - 450 x10*3/uL         Assessment/Plan      GILBERT on CKD  Hypertension  Hypercalcemia, improved  Anemia, at goal    SCr has peaked and looks to be on the way down.  As previously discussed, she had underlying CKD but baseline SCr is unknown.      Corrected calcium was down to 10 mg/dL this morning and I will watch off IV fluids.  Workup for hypercalcemia is ongoing - noted appropriately suppressed PTH and optimal 25-OH vitamin D level.  PTHrp, 1,25-OH vitamin D and screening for monoclonal gammopathy pending    Continue other care.      Keyur Arciniega MD

## 2024-07-23 NOTE — CONSULTS
Inpatient consult to Neurology  Consult performed by: Leigh Fernando MD  Consult ordered by: Daniela Dasilva DO          History Of Present Illness  Marisela Whitlock is a 89 y.o. female presented after 2 falls at home, 1 in which she hit her head.  The patient reports that she loses her balance and falls.  She had approximately 2 falls over the course of 2 and half or so days.  She denies any loss of consciousness.     Past Medical History  She has a past medical history of COPD (chronic obstructive pulmonary disease) (Multi).    Surgical History  She has no past surgical history on file.     Social History  She reports that she quit smoking about 34 years ago. Her smoking use included cigarettes. She has never used smokeless tobacco. No history on file for alcohol use and drug use.     Allergies  Patient has no known allergies.    Medications  Medications Prior to Admission   Medication Sig Dispense Refill Last Dose    ALPRAZolam (Xanax) 0.25 mg tablet Take 1 tablet (0.25 mg) by mouth 2 times a day as needed for anxiety.       aspirin 81 mg EC tablet Take 1 tablet (81 mg) by mouth once daily.       levothyroxine (Synthroid, Levoxyl) 75 mcg tablet Take 1 tablet (75 mcg) by mouth early in the morning.. Take on an empty stomach at the same time each day, either 30 to 60 minutes prior to breakfast       lisinopril 2.5 mg tablet Take 1 tablet (2.5 mg) by mouth once daily.       mirtazapine (Remeron) 7.5 mg tablet Take 1 tablet (7.5 mg) by mouth once daily at bedtime.       prasugrel (Effient) 10 mg tablet Take 1 tablet (10 mg) by mouth once daily.        Review of Systems    Scheduled medications  amLODIPine, 10 mg, oral, Daily  aspirin, 81 mg, oral, Daily  heparin (porcine), 5,000 Units, subcutaneous, q8h JAMES  [START ON 7/23/2024] levothyroxine, 75 mcg, oral, Daily  mirtazapine, 7.5 mg, oral, Nightly  pantoprazole, 20 mg, oral, Daily before breakfast  prasugrel, 10 mg, oral, Daily      Continuous  "medications  sodium chloride 0.9%, 75 mL/hr, Last Rate: 75 mL/hr (07/22/24 1845)      PRN medications  PRN medications: acetaminophen **OR** acetaminophen **OR** acetaminophen, albuterol, ALPRAZolam, benzocaine-menthol, dextromethorphan-guaifenesin, guaiFENesin, hydrALAZINE, ondansetron ODT **OR** ondansetron, polyethylene glycol    Last Recorded Vitals   Blood pressure 145/64, pulse 86, temperature 36.2 °C (97.2 °F), temperature source Oral, resp. rate 18, height 1.575 m (5' 2\"), weight 54.6 kg (120 lb 5.1 oz), SpO2 99%.    Heart is RRR, Lungs CTAB  Neurologically, pt is awake and oriented x4. Attention, concentration, memory, cortical processing are intact. No aphasia  Cranial nerves: VFF, EOMI, No nystagmus, Face is symmetric to sensory and motor, no dysarthria, Tongue protrudes midline, Shrug symmetric  Motor: 5/5 strength B throughout, no tremor or asterixis  Sensation is intact bilaterally throughout  Coordination: no ataxia, no dysmetria/dysdiadochokinesia  DTR symmetric  Gait able to assess    Relevant Results    Results for orders placed or performed during the hospital encounter of 07/21/24 (from the past 96 hour(s))   CBC and Auto Differential   Result Value Ref Range    WBC 2.5 (L) 4.4 - 11.3 x10*3/uL    nRBC 0.0 0.0 - 0.0 /100 WBCs    RBC 3.56 (L) 4.00 - 5.20 x10*6/uL    Hemoglobin 11.8 (L) 12.0 - 16.0 g/dL    Hematocrit 35.5 (L) 36.0 - 46.0 %     80 - 100 fL    MCH 33.1 26.0 - 34.0 pg    MCHC 33.2 32.0 - 36.0 g/dL    RDW 14.2 11.5 - 14.5 %    Platelets 181 150 - 450 x10*3/uL    Neutrophils % 54.7 40.0 - 80.0 %    Immature Granulocytes %, Automated 0.0 0.0 - 0.9 %    Lymphocytes % 29.4 13.0 - 44.0 %    Monocytes % 11.0 2.0 - 10.0 %    Eosinophils % 3.7 0.0 - 6.0 %    Basophils % 1.2 0.0 - 2.0 %    Neutrophils Absolute 1.34 (L) 1.60 - 5.50 x10*3/uL    Immature Granulocytes Absolute, Automated 0.00 0.00 - 0.50 x10*3/uL    Lymphocytes Absolute 0.72 (L) 0.80 - 3.00 x10*3/uL    Monocytes Absolute 0.27 " 0.05 - 0.80 x10*3/uL    Eosinophils Absolute 0.09 0.00 - 0.40 x10*3/uL    Basophils Absolute 0.03 0.00 - 0.10 x10*3/uL   Basic metabolic panel   Result Value Ref Range    Glucose 103 (H) 65 - 99 mg/dL    Sodium 139 133 - 145 mmol/L    Potassium 3.8 3.4 - 5.1 mmol/L    Chloride 102 97 - 107 mmol/L    Bicarbonate 26 24 - 31 mmol/L    Urea Nitrogen 31 (H) 8 - 25 mg/dL    Creatinine 2.80 (H) 0.40 - 1.60 mg/dL    eGFR 16 (L) >60 mL/min/1.73m*2    Calcium 11.7 (H) 8.5 - 10.4 mg/dL    Anion Gap 11 <=19 mmol/L   Creatine Kinase   Result Value Ref Range    Creatine Kinase 20 (L) 24 - 195 U/L   Urinalysis with Reflex Culture and Microscopic   Result Value Ref Range    Color, Urine Light-Yellow Light-Yellow, Yellow, Dark-Yellow    Appearance, Urine Clear Clear    Specific Gravity, Urine 1.015 1.005 - 1.035    pH, Urine 6.0 5.0, 5.5, 6.0, 6.5, 7.0, 7.5, 8.0    Protein, Urine 10 (TRACE) NEGATIVE, 10 (TRACE), 20 (TRACE) mg/dL    Glucose, Urine Normal Normal mg/dL    Blood, Urine NEGATIVE NEGATIVE    Ketones, Urine NEGATIVE NEGATIVE mg/dL    Bilirubin, Urine NEGATIVE NEGATIVE    Urobilinogen, Urine Normal Normal mg/dL    Nitrite, Urine NEGATIVE NEGATIVE    Leukocyte Esterase, Urine NEGATIVE NEGATIVE   Extra Urine Gray Tube   Result Value Ref Range    Extra Tube Hold for add-ons.    Urinalysis Microscopic   Result Value Ref Range    WBC, Urine 1-5 1-5, NONE /HPF    RBC, Urine NONE NONE, 1-2, 3-5 /HPF    Mucus, Urine FEW Reference range not established. /LPF    Hyaline Casts, Urine OCCASIONAL (A) NONE /LPF   Protein, Urine Random   Result Value Ref Range    Total Protein, Urine Random 25 5 - 25 mg/dL   ECG 12 lead   Result Value Ref Range    Ventricular Rate 72 BPM    Atrial Rate 72 BPM    ND Interval 176 ms    QRS Duration 92 ms    QT Interval 414 ms    QTC Calculation(Bazett) 453 ms    P Axis 77 degrees    R Axis -61 degrees    T Axis 63 degrees    QRS Count 12 beats    Q Onset 212 ms    P Onset 124 ms    P Offset 176 ms    T  Offset 419 ms    QTC Fredericia 440 ms   Comprehensive metabolic panel   Result Value Ref Range    Glucose 91 65 - 99 mg/dL    Sodium 142 133 - 145 mmol/L    Potassium 3.6 3.4 - 5.1 mmol/L    Chloride 105 97 - 107 mmol/L    Bicarbonate 28 24 - 31 mmol/L    Urea Nitrogen 31 (H) 8 - 25 mg/dL    Creatinine 2.90 (H) 0.40 - 1.60 mg/dL    eGFR 15 (L) >60 mL/min/1.73m*2    Calcium 10.4 8.5 - 10.4 mg/dL    Albumin 3.2 (L) 3.5 - 5.0 g/dL    Alkaline Phosphatase 67 35 - 125 U/L    Total Protein 5.4 (L) 5.9 - 7.9 g/dL    AST 10 5 - 40 U/L    Bilirubin, Total 0.4 0.1 - 1.2 mg/dL    ALT <5 (L) 5 - 40 U/L    Anion Gap 9 <=19 mmol/L   CBC and Auto Differential   Result Value Ref Range    WBC 2.0 (L) 4.4 - 11.3 x10*3/uL    nRBC 0.0 0.0 - 0.0 /100 WBCs    RBC 3.17 (L) 4.00 - 5.20 x10*6/uL    Hemoglobin 10.5 (L) 12.0 - 16.0 g/dL    Hematocrit 32.2 (L) 36.0 - 46.0 %     (H) 80 - 100 fL    MCH 33.1 26.0 - 34.0 pg    MCHC 32.6 32.0 - 36.0 g/dL    RDW 14.3 11.5 - 14.5 %    Platelets 169 150 - 450 x10*3/uL    Neutrophils % 55.6 40.0 - 80.0 %    Immature Granulocytes %, Automated 0.0 0.0 - 0.9 %    Lymphocytes % 29.9 13.0 - 44.0 %    Monocytes % 9.0 2.0 - 10.0 %    Eosinophils % 4.5 0.0 - 6.0 %    Basophils % 1.0 0.0 - 2.0 %    Neutrophils Absolute 1.12 (L) 1.60 - 5.50 x10*3/uL    Immature Granulocytes Absolute, Automated 0.00 0.00 - 0.50 x10*3/uL    Lymphocytes Absolute 0.60 (L) 0.80 - 3.00 x10*3/uL    Monocytes Absolute 0.18 0.05 - 0.80 x10*3/uL    Eosinophils Absolute 0.09 0.00 - 0.40 x10*3/uL    Basophils Absolute 0.02 0.00 - 0.10 x10*3/uL   Iron and TIBC   Result Value Ref Range    Iron 74 30 - 160 ug/dL    UIBC 159 110 - 370 ug/dL    TIBC 233 228 - 428 ug/dL    % Saturation 32 12 - 50 %   Ferritin   Result Value Ref Range    Ferritin 154 (H) 13 - 150 ng/mL   PTH, Intact   Result Value Ref Range    Parathyroid Hormone, Intact 10.0 (L) 18.5 - 88.0 pg/mL   Calcium, ionized   Result Value Ref Range    POCT Calcium, Ionized 1.46  (H) 1.1 - 1.33 mmol/L   TSH   Result Value Ref Range    Thyroid Stimulating Hormone 1.45 0.27 - 4.20 mIU/L   Vitamin D 25-Hydroxy,Total (for eval of Vitamin D levels)   Result Value Ref Range    Vitamin D, 25-Hydroxy, Total 43 30 - 100 ng/mL   Vitamin B12   Result Value Ref Range    Vitamin B12 790 211 - 946 pg/mL   Folate   Result Value Ref Range    Folate, Serum 8.8 4.2 - 19.9 ng/mL   Protein, Total   Result Value Ref Range    Total Protein 5.2 (L) 6.4 - 8.2 g/dL        ECG 12 lead    Result Date: 7/22/2024  Normal sinus rhythm Left anterior fascicular block Abnormal ECG No previous ECGs available Confirmed by Leonid Taylor ([id number]) on 7/22/2024 10:32:10 AM    US renal complete    Result Date: 7/22/2024  Interpreted By:  Dianna Peñaloza, STUDY: US RENAL COMPLETE; 7/22/2024 7:52 am   INDICATION: Signs/Symptoms:R/O Hydronephrosis. Renal failure   COMPARISON: None.   ACCESSION NUMBER(S): [id number]   ORDERING CLINICIAN: BRIAN TAYLOR   TECHNIQUE: Grayscale and color Doppler imaging of the kidneys.   FINDINGS: The right kidney measures 7.5 cm in length. The left kidney measures 7.8 cm in length. There is no shadowing calculus, hydronephrosis, or solid mass identified. The renal cortical thickness is within normal range. Renal cortical echogenicity is slightly increased when compared with that of the liver.   Cursory evaluation of the urinary bladder shows bilateral ureteral jets. Urinary bladder is not well distended.       No hydronephrosis of either kidney.   Small kidneys bilaterally with slight increase in renal cortical echogenicity which may indicate medical renal disease.   MACRO: None.   Signed by: Dianna Peñaloza 7/22/2024 8:04 AM Dictation workstation:   [id number]    CT head wo IV contrast    Result Date: 7/21/2024  Interpreted By:  Becki Bennett, STUDY: CT HEAD WO IV CONTRAST;  7/21/2024 7:19 pm   INDICATION: Signs/Symptoms:fall, AMS.   COMPARISON: None.   ACCESSION NUMBER(S): [id number]   ORDERING CLINICIAN:  DEYANIRA NEELY   TECHNIQUE: Axial noncontrast CT images of the head.   FINDINGS: BRAIN PARENCHYMA: Gray-white matter interfaces are preserved. No mass effect or midline shift. Mild nonspecific right lung changes and parenchymal volume loss. Atherosclerotic calcifications noted. 10 mm calcified lesion extends superiorly from the left anterior clinoid process   HEMORRHAGE: No acute intracranial hemorrhage. VENTRICLES and EXTRA-AXIAL SPACES: The ventricles, sulci and basal cisterns enlarged, concordant with parenchymal volume loss. EXTRACRANIAL SOFT TISSUES: Within normal limits. PARANASAL SINUSES/MASTOIDS: The visualized paranasal sinuses and mastoid air cells are aerated. CALVARIUM: No depressed skull fracture. No destructive osseous lesion.   OTHER FINDINGS: None.       No acute intracranial hemorrhage or mass effect   Probable left supraclinoid 10 mm meningioma. MRI with contrast may be considered on a nonemergent basis for further characterization as clinically indicated.   Nonspecific white matter changes and parenchymal volume loss.   MACRO: None.   Signed by: Becki Bennett 7/21/2024 7:53 PM Dictation workstation:   [id number]        Assessment/Plan   Principal Problem:    GILBERT (acute kidney injury) (CMS-[facility])  Active Problems:    Fall    Meningioma (Multi)    Hypercalcemia    Neutropenia (CMS-HCC)    Acute kidney injury (CMS-HCC)    89-year-old woman with history of meningioma has had multiple falls at home.  These were unwitnessed.  Due to the low but existing risks associated with meningiomas I have ordered an MRI of the brain without contrast (elevated eGFR) as well as an EEG and Dr. Dye will follow-up results tomorrow.    I personally spent 60 minutes today, exclusive of procedures, providing care for this patient, including preparation, face to face time, documentation and other services such as review of medical records, diagnostic result, patient education, counseling, coordination of care as  specified in the encounter.

## 2024-07-23 NOTE — PROGRESS NOTES
Occupational Therapy                 Therapy Communication Note    Patient Name: Marisela Whitlock  MRN: [id number]  Today's Date: 7/23/2024     Discipline: Occupational Therapy    Missed Visit Reason: Missed Visit Reason: Other (Comment) (pt off floor for MRI of brain)    Missed Time: Attempt    Comment:

## 2024-07-23 NOTE — PROGRESS NOTES
Marisela Whitlock is a 89 y.o. female on day 1 of admission presenting with GILBERT (acute kidney injury) (CMS-[facility]).      Subjective   Patient reports that she feels okay today. Tells me that she remains very weak and needs a lot of help to get up.   Denies pain. Does not like her diet.  Daughter bedside.       Objective     Last Recorded Vitals  /63 (BP Location: Left arm, Patient Position: Sitting)   Pulse 81   Temp 36.2 °C (97.2 °F) (Oral)   Resp 17   Wt 54.6 kg (120 lb 5.1 oz)   SpO2 100%   Intake/Output last 3 Shifts:    Intake/Output Summary (Last 24 hours) at 7/23/2024 1442  Last data filed at 7/22/2024 2153  Gross per 24 hour   Intake 1568.75 ml   Output 150 ml   Net 1418.75 ml       Admission Weight  Weight: 54.6 kg (120 lb 5.1 oz) (07/21/24 1824)    Daily Weight  07/21/24 : 54.6 kg (120 lb 5.1 oz)    Image Results  MR brain wo IV contrast  Narrative: Interpreted By:  Anjali Cadet,   STUDY:  MR BRAIN WO IV CONTRAST;  7/23/2024 1:34 pm      INDICATION:  Signs/Symptoms:meningioma.      COMPARISON:  Head CT July 21, 2024      ACCESSION NUMBER(S):  [id number]      ORDERING CLINICIAN:  DEANGELO BAKER      TECHNIQUE:  Axial T2, FLAIR, DWI, gradient echo T2 and sagittal and coronal T1  weighted images of brain were acquired.      FINDINGS:  CSF Spaces: There is prominence of ventricles and sulci compatible  with diffuse parenchymal volume loss. There is an extra-axial mass  demonstrating diminished signal on T1 weighted imaging measuring  approximately 8 mm centered in the left paraclinoid region and  corresponding to the partially calcified mass demonstrated on recent  CT. There is no appreciable mass effect or midline shift.      Parenchyma: There is no diffusion restriction abnormality to suggest  acute infarct.  There are mild hyperintensities on FLAIR and T2  weighted imaging in the subcortical and periventricular white matter.  Prominent perivascular spaces and/or remote lacunar infarcts are  noted  "in the basal ganglia and thalami. There is no mass effect or  midline shift.      Paranasal Sinuses and Mastoids: There is minimal mucosal thickening  within scattered paranasal sinuses and opacification of a few mastoid  air cells.      T1 weighted imaging demonstrates heterogeneous marrow signal  throughout the included portion of the cervical spine which is  entirely nonspecific. There are also degenerative changes of the  visualized portion of the cervical spine.      Impression: 1. Redemonstration of a partially calcified mass centered in the left  paraclinoid region, incompletely assessed on this unenhanced  examination but favored to represent a meningioma.  2. Minimal white-matter changes are nonspecific and most likely  represent small-vessel ischemic disease in patient of this age. No  evidence of acute ischemic injury.          MACRO:  None      Signed by: Anjali Cadet 7/23/2024 2:07 PM  Dictation workstation:   [id number]      Physical Exam  General: alert, no diaphoresis, thin   Lungs: CTA BL   Heart: RRR, no LE edema BL   GI: abdomen soft, nontender, nondistended, BS present   MSK: no joint effusion or deformity   Skin: no rashes, erythema, or ecchymosis   Neuro: grossly normal cognition, motor strength, sensation      Relevant Results               Assessment/Plan        Mechanical fall with head trama  - CT head without acute findings. There is a meningioma noted  - PT/OT  - fall precautions    GILBERT VS CKD  - tells me she has history of \"nephritis\" but does not know much else about this. Unknown baseline labs as her previous lab work was apparently in [place]. Daughter tells me she has \"pretty bad\" kidney disease but doesn't know stage.  - nephrology consulted  - holding nephrotoxic agents  - had some improvement in creatinine overnight; likely acute on chronic kidney disease    Neutropenia  - incidental and does not appear to have any signs of infection. ID saw the patient, no further " recommendations at this point    Hypercalcemia  - getting fluids, further lab work ordered and nephrology to see    Anemia  - stable; no signs of bleeding    Hypothyroidism  - unknown home synthroid dose- will resume when we are able    Incidental meningioma on brain imaging  - neurology on consult. Given multiple unwitnessed falls, Dr. Fernando has ordered MRI which patient is getting today.    HTN  - ARB on hold due to renal function. Norvasc started and hydralazine PRN      DVT ppx        Principal Problem:    GILBERT (acute kidney injury) (CMS-HCC)  Active Problems:    Fall    Meningioma (Multi)    Hypercalcemia    Neutropenia (CMS-[facility])    Acute kidney injury (CMS-[facility])           Malnutrition Diagnosis Status: New  Malnutrition Diagnosis: Severe malnutrition related to chronic disease or condition  As Evidenced by: moderate fat and muscle deficits, PO intake < 75% for > 1 month  I agree with the dietitian's malnutrition diagnosis.        Dispo: patient will likely require SNF on discharge given weakness. Today, [facility] contacted me saying patient has [employer] [facility] preferred medicare. They requested that we transfer to [facility] if able. I have put in transfer request and awaiting to hear back. Patient's daughter is agreeable with this.      Transfer center update- accepted at [facility] by Dr. Prescott. No beds at this time. Will continue taking care of patient as outlined above until she can be transferred or until she can be discharged, whichever happens first.       Daniela Dasilva,

## 2024-07-23 NOTE — PROGRESS NOTES
Physical Therapy                 Therapy Communication Note    Patient Name: Marisela Whitlock  MRN: [id number]  Today's Date: 7/23/2024     Discipline: Physical Therapy    Missed Visit Reason: Missed Visit Reason: Other (Comment) (Pt currently off the floor for an MRI of brain.)    Missed Time: Attempt

## 2024-07-23 NOTE — NURSING NOTE
Patient ambulated to bathroom and is now sitting up in chair. Photo taken of patient's left elbow and dressing changed.

## 2024-07-23 NOTE — CARE PLAN
The patient's goals for the shift include  increased mobility    The clinical goals for the shift include Maintain safety.

## 2024-07-23 NOTE — CARE PLAN
The patient's goals for the shift include      The clinical goals for the shift include maintain safety      Problem: Skin  Goal: Decreased wound size/increased tissue granulation at next dressing change  Outcome: Progressing  Goal: Participates in plan/prevention/treatment measures  Outcome: Progressing  Goal: Prevent/manage excess moisture  Outcome: Progressing  Goal: Prevent/minimize sheer/friction injuries  Outcome: Progressing  Goal: Promote/optimize nutrition  Outcome: Progressing  Goal: Promote skin healing  Outcome: Progressing     Problem: Fall/Injury  Goal: Not fall by end of shift  Outcome: Progressing  Goal: Be free from injury by end of the shift  Outcome: Progressing  Goal: Verbalize understanding of personal risk factors for fall in the hospital  Outcome: Progressing  Goal: Verbalize understanding of risk factor reduction measures to prevent injury from fall in the home  Outcome: Progressing  Goal: Use assistive devices by end of the shift  Outcome: Progressing  Goal: Pace activities to prevent fatigue by end of the shift  Outcome: Progressing     Problem: Bathing  Goal: STG - Patient will bathe lower body with close supervision and set up  Outcome: Progressing     Problem: Dressings Lower Extremities  Goal: STG - Patient to complete lower body dressing with close supervision  Outcome: Progressing     Problem: Dressing Upper Extremities  Goal: LTG - Patient will complete upper body dressing independent  Outcome: Progressing     Problem: Grooming  Goal: STG - Patient completes grooming independent  Outcome: Progressing     Problem: Toileting  Goal: STG - Patient will complete toileting tasks with 2ww and close supervision  Outcome: Progressing     Problem: Pain - Adult  Goal: Verbalizes/displays adequate comfort level or baseline comfort level  Outcome: Progressing     Problem: Safety - Adult  Goal: Free from fall injury  Outcome: Progressing     Problem: Discharge Planning  Goal: Discharge to home or  other facility with appropriate resources  Outcome: Progressing     Problem: Chronic Conditions and Co-morbidities  Goal: Patient's chronic conditions and co-morbidity symptoms are monitored and maintained or improved  Outcome: Progressing

## 2024-07-23 NOTE — PROGRESS NOTES
Pt accepted with [facility]. Will need to send DC and AVS summary once patient is discharged to [facility] via careport.     PATIENT HAS SECURE DISCHARGE TO RETURN HOME WITH [facility].        07/23/24 1410   Current Planned Discharge Disposition   Current Planned Discharge Disposition Home H  ([facility] ACCEPTED)

## 2024-07-23 NOTE — NURSING NOTE
Patient was excepted at [facility] but there is no beds so when patient transfers please call patient's daughter Gisele Venegas.

## 2024-07-23 NOTE — NURSING NOTE
BSSR given and patient is in bed awake and night nurse is aware to call daughter when [facility] has a bed for this patient. Call light and possessions within reach.

## 2024-07-24 ENCOUNTER — APPOINTMENT (OUTPATIENT)
Dept: NEUROLOGY | Facility: HOSPITAL | Age: 89
DRG: 682 | End: 2024-07-24
Payer: MEDICARE

## 2024-07-24 VITALS
TEMPERATURE: 97.3 F | RESPIRATION RATE: 17 BRPM | DIASTOLIC BLOOD PRESSURE: 59 MMHG | SYSTOLIC BLOOD PRESSURE: 149 MMHG | HEIGHT: 62 IN | WEIGHT: 120.32 LBS | OXYGEN SATURATION: 100 % | BODY MASS INDEX: 22.14 KG/M2 | HEART RATE: 70 BPM

## 2024-07-24 LAB
1,25(OH)2D SERPL-MCNC: 12.4 PG/ML (ref 19.9–79.3)
ALBUMIN MFR UR ELPH: 22.6 %
ALBUMIN SERPL-MCNC: 3.1 G/DL (ref 3.5–5)
ALBUMIN: 3.1 G/DL (ref 3.4–5)
ALPHA 1 GLOBULIN: 0.2 G/DL (ref 0.2–0.6)
ALPHA 2 GLOBULIN: 0.6 G/DL (ref 0.4–1.1)
ALPHA1 GLOB MFR UR ELPH: 19.9 %
ALPHA2 GLOB MFR UR ELPH: 17.7 %
ANION GAP SERPL CALC-SCNC: 11 MMOL/L
B-GLOBULIN MFR UR ELPH: 27.7 %
BETA GLOBULIN: 0.7 G/DL (ref 0.5–1.2)
BUN SERPL-MCNC: 21 MG/DL (ref 8–25)
CALCIUM SERPL-MCNC: 8.6 MG/DL (ref 8.5–10.4)
CHLORIDE SERPL-SCNC: 111 MMOL/L (ref 97–107)
CO2 SERPL-SCNC: 23 MMOL/L (ref 24–31)
CREAT SERPL-MCNC: 2.2 MG/DL (ref 0.4–1.6)
EGFRCR SERPLBLD CKD-EPI 2021: 21 ML/MIN/1.73M*2
ERYTHROCYTE [DISTWIDTH] IN BLOOD BY AUTOMATED COUNT: 14.7 % (ref 11.5–14.5)
GAMMA GLOB MFR UR ELPH: 12.1 %
GAMMA GLOBULIN: 0.6 G/DL (ref 0.5–1.4)
GLUCOSE SERPL-MCNC: 100 MG/DL (ref 65–99)
HCT VFR BLD AUTO: 30 % (ref 36–46)
HGB BLD-MCNC: 9.7 G/DL (ref 12–16)
IMMUNOFIXATION COMMENT: ABNORMAL
IMMUNOFIXATION COMMENT: ABNORMAL
M-PROTEIN 1 URINE %: 5.5 %
MCH RBC QN AUTO: 33 PG (ref 26–34)
MCHC RBC AUTO-ENTMCNC: 32.3 G/DL (ref 32–36)
MCV RBC AUTO: 102 FL (ref 80–100)
NRBC BLD-RTO: 0 /100 WBCS (ref 0–0)
PATH REVIEW - SERUM IMMUNOFIXATION: ABNORMAL
PATH REVIEW - URINE IMMUNOFIXATION: ABNORMAL
PATH REVIEW-SERUM PROTEIN ELECTROPHORESIS: ABNORMAL
PATH REVIEW-URINE PROTEIN ELECTROPHORESIS: ABNORMAL
PHOSPHATE SERPL-MCNC: 3 MG/DL (ref 2.5–4.5)
PLATELET # BLD AUTO: 160 X10*3/UL (ref 150–450)
POTASSIUM SERPL-SCNC: 3.5 MMOL/L (ref 3.4–5.1)
PROTEIN ELECTROPHORESIS COMMENT: ABNORMAL
RBC # BLD AUTO: 2.94 X10*6/UL (ref 4–5.2)
SODIUM SERPL-SCNC: 145 MMOL/L (ref 133–145)
URINE ELECTROPHORESIS COMMENT: ABNORMAL
WBC # BLD AUTO: 2.1 X10*3/UL (ref 4.4–11.3)

## 2024-07-24 PROCEDURE — 97530 THERAPEUTIC ACTIVITIES: CPT | Mod: GO,CO

## 2024-07-24 PROCEDURE — 2500000004 HC RX 250 GENERAL PHARMACY W/ HCPCS (ALT 636 FOR OP/ED): Performed by: INTERNAL MEDICINE

## 2024-07-24 PROCEDURE — 80069 RENAL FUNCTION PANEL: CPT | Performed by: HOSPITALIST

## 2024-07-24 PROCEDURE — 2500000001 HC RX 250 WO HCPCS SELF ADMINISTERED DRUGS (ALT 637 FOR MEDICARE OP): Performed by: INTERNAL MEDICINE

## 2024-07-24 PROCEDURE — 85027 COMPLETE CBC AUTOMATED: CPT | Performed by: HOSPITALIST

## 2024-07-24 PROCEDURE — 36415 COLL VENOUS BLD VENIPUNCTURE: CPT | Performed by: HOSPITALIST

## 2024-07-24 PROCEDURE — 2500000001 HC RX 250 WO HCPCS SELF ADMINISTERED DRUGS (ALT 637 FOR MEDICARE OP): Performed by: HOSPITALIST

## 2024-07-24 PROCEDURE — 2500000002 HC RX 250 W HCPCS SELF ADMINISTERED DRUGS (ALT 637 FOR MEDICARE OP, ALT 636 FOR OP/ED): Performed by: INTERNAL MEDICINE

## 2024-07-24 PROCEDURE — 2500000002 HC RX 250 W HCPCS SELF ADMINISTERED DRUGS (ALT 637 FOR MEDICARE OP, ALT 636 FOR OP/ED): Performed by: HOSPITALIST

## 2024-07-24 RX ORDER — AMLODIPINE BESYLATE 10 MG/1
10 TABLET ORAL DAILY
Qty: 30 TABLET | Refills: 0 | Status: SHIPPED | OUTPATIENT
Start: 2024-07-25 | End: 2024-09-05

## 2024-07-24 RX ORDER — POTASSIUM CHLORIDE 1.5 G/1.58G
40 POWDER, FOR SOLUTION ORAL ONCE
Status: COMPLETED | OUTPATIENT
Start: 2024-07-24 | End: 2024-07-24

## 2024-07-24 RX ADMIN — POTASSIUM CHLORIDE 40 MEQ: 1.5 POWDER, FOR SOLUTION ORAL at 11:43

## 2024-07-24 RX ADMIN — PANTOPRAZOLE SODIUM 20 MG: 20 TABLET, DELAYED RELEASE ORAL at 06:33

## 2024-07-24 RX ADMIN — LEVOTHYROXINE SODIUM 75 MCG: 0.07 TABLET ORAL at 06:33

## 2024-07-24 RX ADMIN — AMLODIPINE BESYLATE 10 MG: 10 TABLET ORAL at 08:32

## 2024-07-24 RX ADMIN — ASPIRIN 81 MG: 81 TABLET, COATED ORAL at 08:32

## 2024-07-24 RX ADMIN — PRASUGREL 10 MG: 10 TABLET, FILM COATED ORAL at 08:32

## 2024-07-24 RX ADMIN — HEPARIN SODIUM 5000 UNITS: 5000 INJECTION, SOLUTION INTRAVENOUS; SUBCUTANEOUS at 06:33

## 2024-07-24 RX ADMIN — ACETAMINOPHEN 650 MG: 325 TABLET ORAL at 12:01

## 2024-07-24 ASSESSMENT — COGNITIVE AND FUNCTIONAL STATUS - GENERAL
TURNING FROM BACK TO SIDE WHILE IN FLAT BAD: A LITTLE
DAILY ACTIVITIY SCORE: 17
TOILETING: A LITTLE
DRESSING REGULAR LOWER BODY CLOTHING: A LOT
MOVING TO AND FROM BED TO CHAIR: A LITTLE
DRESSING REGULAR UPPER BODY CLOTHING: A LITTLE
MOVING FROM LYING ON BACK TO SITTING ON SIDE OF FLAT BED WITH BEDRAILS: A LITTLE
EATING MEALS: A LITTLE
HELP NEEDED FOR BATHING: A LITTLE
TOILETING: A LITTLE
HELP NEEDED FOR BATHING: A LITTLE
PERSONAL GROOMING: A LITTLE
STANDING UP FROM CHAIR USING ARMS: A LITTLE
DRESSING REGULAR UPPER BODY CLOTHING: A LITTLE
WALKING IN HOSPITAL ROOM: A LITTLE
PERSONAL GROOMING: A LITTLE
MOBILITY SCORE: 18
CLIMB 3 TO 5 STEPS WITH RAILING: A LITTLE
EATING MEALS: A LITTLE

## 2024-07-24 ASSESSMENT — PAIN SCALES - GENERAL
PAINLEVEL_OUTOF10: 4
PAINLEVEL_OUTOF10: 0 - NO PAIN

## 2024-07-24 ASSESSMENT — PAIN DESCRIPTION - LOCATION: LOCATION: HEAD

## 2024-07-24 ASSESSMENT — PAIN - FUNCTIONAL ASSESSMENT
PAIN_FUNCTIONAL_ASSESSMENT: 0-10

## 2024-07-24 NOTE — DISCHARGE INSTRUCTIONS
Take medications as prescribed    Get blood work in 1 wk  Follow up with PCP in 1-2 weeks  Follow up with nephrology  Follow up with neurology

## 2024-07-24 NOTE — CARE PLAN
The patient's goals for the shift include      The clinical goals for the shift include increase mobility    Problem: Skin  Goal: Decreased wound size/increased tissue granulation at next dressing change  Outcome: Progressing  Flowsheets (Taken 7/24/2024 0906)  Decreased wound size/increased tissue granulation at next dressing change:   Protective dressings over bony prominences   Utilize specialty bed per algorithm  Goal: Participates in plan/prevention/treatment measures  Outcome: Progressing  Flowsheets (Taken 7/24/2024 0906)  Participates in plan/prevention/treatment measures:   Increase activity/out of bed for meals   Elevate heels  Goal: Prevent/manage excess moisture  Outcome: Progressing  Flowsheets (Taken 7/24/2024 0906)  Prevent/manage excess moisture: Cleanse incontinence/protect with barrier cream  Goal: Prevent/minimize sheer/friction injuries  Outcome: Progressing  Flowsheets (Taken 7/24/2024 0906)  Prevent/minimize sheer/friction injuries:   HOB 30 degrees or less   Increase activity/out of bed for meals   Use pull sheet  Goal: Promote/optimize nutrition  Outcome: Progressing  Flowsheets (Taken 7/24/2024 0906)  Promote/optimize nutrition:   Monitor/record intake including meals   Consume > 50% meals/supplements   Offer water/supplements/favorite foods  Goal: Promote skin healing  Outcome: Progressing  Flowsheets (Taken 7/24/2024 0906)  Promote skin healing:   Protective dressings over bony prominences   Assess skin/pad under line(s)/device(s)     Problem: Fall/Injury  Goal: Not fall by end of shift  Outcome: Progressing  Goal: Be free from injury by end of the shift  Outcome: Progressing  Goal: Verbalize understanding of personal risk factors for fall in the hospital  Outcome: Progressing  Goal: Verbalize understanding of risk factor reduction measures to prevent injury from fall in the home  Outcome: Progressing  Goal: Use assistive devices by end of the shift  Outcome: Progressing  Goal: Pace  activities to prevent fatigue by end of the shift  Outcome: Progressing     Problem: Bathing  Goal: STG - Patient will bathe lower body with close supervision and set up  Outcome: Progressing     Problem: Dressings Lower Extremities  Goal: STG - Patient to complete lower body dressing with close supervision  Outcome: Progressing     Problem: Dressing Upper Extremities  Goal: LTG - Patient will complete upper body dressing independent  Outcome: Progressing     Problem: Grooming  Goal: STG - Patient completes grooming independent  Outcome: Progressing     Problem: Toileting  Goal: STG - Patient will complete toileting tasks with 2ww and close supervision  Outcome: Progressing     Problem: Pain - Adult  Goal: Verbalizes/displays adequate comfort level or baseline comfort level  Outcome: Progressing     Problem: Safety - Adult  Goal: Free from fall injury  Outcome: Progressing     Problem: Discharge Planning  Goal: Discharge to home or other facility with appropriate resources  Outcome: Progressing     Problem: Chronic Conditions and Co-morbidities  Goal: Patient's chronic conditions and co-morbidity symptoms are monitored and maintained or improved  Outcome: Progressing

## 2024-07-24 NOTE — NURSING NOTE
Discharge instructions given to both patient and daughter. Both verbalized understanding. Amlodipine script sent to pharmacy. Patient and daughter aware. IV removed and patient discharge in stable condition.

## 2024-07-24 NOTE — CONSULTS
"Nutrition Follow up Note    Nutrition Assessment         Possible discharge tomorrow. PO intake has slightly improved.     Nutrition History:  Food and Nutrient History: Pt reported decreased PO intake for > 1 month. Pt stated she did not bring dentures  Energy Intake: Fair 50-75 %  Food Allergies/Intolerances:  None  GI Symptoms: None  Oral Problems: None    Anthropometrics:  Ht: 157.5 cm (5' 2\"), Wt: 54.6 kg (120 lb 5.1 oz), BMI: 22             Weight Change:  Daily Weight  07/21/24 : 54.6 kg (120 lb 5.1 oz)     Weight History / % Weight Change: Pt reported 6-7# weight loss over 1 month due to decreased PO intake. Pt reported UBW of 120#             Nutrition Focused Physical Exam Findings:   Subcutaneous Fat Loss  Orbital Fat Pads: Mild-Moderate (slight dark circles and slight hollowing)  Buccal Fat Pads: Mild-Moderate (flat cheeks, minimal bounce)    Muscle Wasting  Temporalis: Mild-Moderate (slight depression)  Pectoralis (Clavicular Region): Mild-Moderate (some protrusion of clavicle)  Deltoid/Trapezius: Mild-Moderate (slight protrusion of acromion process)  Interosseous: Mild-Moderate (slightly depressed area between thumb and forefinger)              Nutrition Significant Labs:  Lab Results   Component Value Date    WBC 2.1 (L) 07/24/2024    HGB 9.7 (L) 07/24/2024    HCT 30.0 (L) 07/24/2024     07/24/2024    ALT <5 (L) 07/22/2024    AST 10 07/22/2024     07/24/2024    K 3.5 07/24/2024     (H) 07/24/2024    CREATININE 2.20 (H) 07/24/2024    BUN 21 07/24/2024    CO2 23 (L) 07/24/2024    TSH 1.45 07/22/2024     Nutrition Specific Medications:  amLODIPine, 10 mg, oral, Daily  aspirin, 81 mg, oral, Daily  heparin (porcine), 5,000 Units, subcutaneous, q8h JAMES  levothyroxine, 75 mcg, oral, Daily  mirtazapine, 7.5 mg, oral, Nightly  prasugrel, 10 mg, oral, Daily        Dietary Orders (From admission, onward)       Start     Ordered    07/23/24 5677  Adult diet Regular  Diet effective now      "   Question:  Diet type  Answer:  Regular    07/23/24 1446    07/22/24 1153  Oral nutritional supplements  Until discontinued        Comments: chocolate   Question Answer Comment   Deliver with All meals    Select supplement: Magic Cup        07/22/24 1152                  Estimated Needs:   Estimated Energy Needs  Total Energy Estimated Needs (kCal): 1638 kCal  Total Estimated Energy Need per Day (kCal/kg): 30 kCal/kg  Method for Estimating Needs: actual wt    Estimated Protein Needs  Total Protein Estimated Needs (g): 66 g  Total Protein Estimated Needs (g/kg): 1.2 g/kg  Method for Estimating Needs: actual wt    Estimated Fluid Needs  Total Fluid Estimated Needs (mL): 1638 mL  Method for Estimating Needs: 1 mL/kcal        Nutrition Diagnosis   Nutrition Diagnosis:  Malnutrition Diagnosis  Patient has Malnutrition Diagnosis: Yes  Diagnosis Status: Ongoing  Malnutrition Diagnosis: Severe malnutrition related to chronic disease or condition  As Evidenced by: moderate fat and muscle deficits, PO intake < 75% for > 1 month            Nutrition Interventions/Recommendations   Nutrition Interventions and Recommendations:    Nutrition Prescription:  Individualized Nutrition Prescription Provided for : 1638 kcals, 66 g protein via diet    Nutrition Interventions:   Food and/or Nutrient Delivery Interventions  Interventions: Meals and snacks, Medical food supplement  Meals and Snacks: General healthful diet  Goal: provide diet as ordered  Medical Food Supplement: Modified food  Goal: magic cup TID to provide 290 kcals and 9g protein each    Education Documentation  No documentation found.           Nutrition Monitoring and Evaluation   Monitoring/Evaluation:   Food/Nutrient Related History Monitoring  Monitoring and Evaluation Plan: Energy intake  Energy Intake: Estimated energy intake  Criteria: pt to consume >/= 75% estimated needs         Time Spent/Follow-up:   Follow Up  Time Spent (min): 30 minutes  Last Date of  Nutrition Visit: 07/24/24  Nutrition Follow-Up Needed?: 5-7 days  Follow up Comment: 7/30/24

## 2024-07-24 NOTE — CARE PLAN
The patient's goals for the shift include      The clinical goals for the shift include pt will sleep a minimum of 4 hours by the end of the shift      Problem: Fall/Injury  Goal: Not fall by end of shift  Outcome: Progressing  Goal: Be free from injury by end of the shift  Outcome: Progressing  Goal: Verbalize understanding of personal risk factors for fall in the hospital  Outcome: Progressing  Goal: Verbalize understanding of risk factor reduction measures to prevent injury from fall in the home  Outcome: Progressing  Goal: Use assistive devices by end of the shift  Outcome: Progressing  Goal: Pace activities to prevent fatigue by end of the shift  Outcome: Progressing

## 2024-07-24 NOTE — PROGRESS NOTES
Occupational Therapy    OT Treatment    Patient Name: Marisela Whitlock  MRN: [id number]  Today's Date: 7/24/2024  Time Calculation  Start Time: 1030  Stop Time: 1043  Time Calculation (min): 13 min        Assessment:  OT Assessment: Tolerating limited activity this date d/t fatigue, demonstrating improved balance during transfer task. Pt would benefit from continued skilled OT services to improve strength, balance, and functional tolerance to increase independence with ADL tasks  End of Session Communication: Bedside nurse  End of Session Patient Position: Bed, 3 rail up, Alarm on  OT Assessment Results: Decreased upper extremity strength, Decreased endurance  Plan:  Treatment Interventions: ADL retraining, Functional transfer training, Endurance training, Patient/family training, Equipment evaluation/education, Neuromuscular reeducation, Compensatory technique education  OT Frequency: 4 times per week  OT Discharge Recommendations: Moderate intensity level of continued care  OT Recommended Transfer Status: Assist of 1, Minimal assist  OT - OK to Discharge: Yes  Treatment Interventions: ADL retraining, Functional transfer training, Endurance training, Patient/family training, Equipment evaluation/education, Neuromuscular reeducation, Compensatory technique education    Subjective   Previous Visit Info:  OT Last Visit  OT Received On: 07/24/24  General:  General  Missed Visit: Yes  Missed Visit Reason: Other (Comment) (pt off floor for MRI of brain)  Prior to Session Communication: Bedside nurse  Patient Position Received: Bed, 3 rail up, Alarm on  General Comment: Cleared for therapy per RN. Pt supine in bed upon arrival and declining engagement in OOB activities, however requesting to get re-adjusted in bed.  Precautions:  Hearing/Visual Limitations: Hearing WFL, wears glasses  Medical Precautions: Fall precautions    Pain:  Pain Assessment  Pain Assessment: 0-10  0-10 (Numeric) Pain Score: 0 - No pain    Objective     Cognition:  Cognition  Overall Cognitive Status: Within Functional Limits    Bed Mobility/Transfers: Bed Mobility  Bed Mobility: Yes  Bed Mobility 1  Bed Mobility 1: Supine to sitting  Level of Assistance 1: Contact guard  Bed Mobility Comments 1: increased effort with assist for trunk elevation and cues to scoot hips to EOB  Bed Mobility 2  Bed Mobility  2: Sitting to supine  Level of Assistance 2: Close supervision  Bed Mobility Comments 2: Pt able to bring BLE on bed with cues to bridge hips and adjust trunk to midline    Transfers  Transfer: Yes  Transfer 1  Technique 1: Sit to stand, Stand to sit  Transfer Device 1: Walker  Transfer Level of Assistance 1: Minimum assistance  Trials/Comments 1: assist for trunk elevation and forward weightshifting with cues for proper hand placement    Functional Mobility:  Functional Mobility  Functional Mobility Performed: Yes  Functional Mobility 1  Device 1: Rolling walker  Assistance 1: Minimum assistance  Comments 1: tolerated taking steps to HOB at FWW with cues for step/walker sequencing, demonstrating fair balance    Standing Balance:  Static Standing Balance  Static Standing-Level of Assistance: Minimum assistance  Static Standing-Comment/Number of Minutes: fair balance during transfer task    Outcome Measures:[facility] Daily Activity  Putting on and taking off regular lower body clothing: A lot  Bathing (including washing, rinsing, drying): A little  Putting on and taking off regular upper body clothing: A little  Toileting, which includes using toilet, bedpan or urinal: A little  Taking care of personal grooming such as brushing teeth: A little  Eating Meals: A little  Daily Activity - Total Score: 17    Education Documentation  ADL Training, taught by MARK Padilla at 7/24/2024 11:45 AM.  Learner: Patient  Readiness: Acceptance  Method: Explanation  Response: Verbalizes Understanding    Education Comments  No comments found.      Problem: Bathing  Goal:  STG - Patient will bathe lower body with close supervision and set up  Outcome: Progressing     Problem: Dressings Lower Extremities  Goal: STG - Patient to complete lower body dressing with close supervision  Outcome: Progressing     Problem: Dressing Upper Extremities  Goal: LTG - Patient will complete upper body dressing independent  Outcome: Progressing     Problem: Grooming  Goal: STG - Patient completes grooming independent  Outcome: Progressing     Problem: Functional Mobility  Goal: Perform functional mobility a household distance with 2ww and close supervision  Outcome: Progressing     Problem: Toileting  Goal: STG - Patient will complete toileting tasks with 2ww and close supervision  Outcome: Progressing     Problem: OT Transfers  Goal: LTG - Patient will perform all functional transfers with close supervision and 2ww  Outcome: Progressing     Problem: Therapeutic Activity  Goal: Perform BUE exercise with close supervsion  Outcome: Progressing

## 2024-07-24 NOTE — PROGRESS NOTES
Marisela Whitlock is a 89 y.o. female on day 2 of admission presenting with GILBERT (acute kidney injury) (CMS-[facility]).      Subjective   No new overnight events.  No dyspnea at rest.  No nausea vomiting or diarrhea.  Decent urine volumes.       Scheduled medications  amLODIPine, 10 mg, oral, Daily  aspirin, 81 mg, oral, Daily  heparin (porcine), 5,000 Units, subcutaneous, q8h JAMES  levothyroxine, 75 mcg, oral, Daily  mirtazapine, 7.5 mg, oral, Nightly  prasugrel, 10 mg, oral, Daily      Continuous medications     PRN medications  PRN medications: acetaminophen **OR** acetaminophen **OR** acetaminophen, albuterol, ALPRAZolam, benzocaine-menthol, dextromethorphan-guaifenesin, guaiFENesin, hydrALAZINE, ondansetron ODT **OR** ondansetron, polyethylene glycol      Objective     Vitals 24HR  Heart Rate:  [74-81]   Temp:  [36.2 °C (97.2 °F)-36.6 °C (97.9 °F)]   Resp:  [17]   BP: (108-150)/(58-65)   SpO2:  [98 %-100 %]     General: Elderly woman, not in distress  Eyes: Not pale, anicteric  Lungs: Clear bilaterally  Heart: S1 and S2, regular  Abdomen: Soft, nontender  Extremities: No pedal edema  Neuro: Awake and interactive      Intake/Output last 3 Shifts:    Intake/Output Summary (Last 24 hours) at 7/24/2024 1101  Last data filed at 7/24/2024 1002  Gross per 24 hour   Intake 465 ml   Output --   Net 465 ml       Relevant Results    Results for orders placed or performed during the hospital encounter of 07/21/24 (from the past 24 hour(s))   Renal Function Panel   Result Value Ref Range    Glucose 100 (H) 65 - 99 mg/dL    Sodium 145 133 - 145 mmol/L    Potassium 3.5 3.4 - 5.1 mmol/L    Chloride 111 (H) 97 - 107 mmol/L    Bicarbonate 23 (L) 24 - 31 mmol/L    Urea Nitrogen 21 8 - 25 mg/dL    Creatinine 2.20 (H) 0.40 - 1.60 mg/dL    eGFR 21 (L) >60 mL/min/1.73m*2    Calcium 8.6 8.5 - 10.4 mg/dL    Phosphorus 3.0 2.5 - 4.5 mg/dL    Albumin 3.1 (L) 3.5 - 5.0 g/dL    Anion Gap 11 <=19 mmol/L   CBC   Result Value Ref Range    WBC 2.1 (L)  4.4 - 11.3 x10*3/uL    nRBC 0.0 0.0 - 0.0 /100 WBCs    RBC 2.94 (L) 4.00 - 5.20 x10*6/uL    Hemoglobin 9.7 (L) 12.0 - 16.0 g/dL    Hematocrit 30.0 (L) 36.0 - 46.0 %     (H) 80 - 100 fL    MCH 33.0 26.0 - 34.0 pg    MCHC 32.3 32.0 - 36.0 g/dL    RDW 14.7 (H) 11.5 - 14.5 %    Platelets 160 150 - 450 x10*3/uL         Assessment/Plan      GILBERT on CKD, improving  Hypertension  Hypercalcemia  Metabolic acidosis  Anemia  Neutropenia    SCr continues to trend downwards but baseline is unknown.    Serum calcium has normalized following volume expansion.  SPEP/UPEP, PTHrp and 1,25-OH vitamin D levels are pending.    Borderline-low K noted and will replace KCl 40 mEq p.o. x 1.  Monitor acidosis without bicarbonate supplementation.    Continue other care.    Aware of plan to transfer to [facility] [place] - we will follow while she is there.       Keyur Arciniega MD

## 2024-07-24 NOTE — PROGRESS NOTES
Marisela Whitlock is a 89 y.o. female on day 2 of admission presenting with GILBERT (acute kidney injury) (CMS-[facility]).    Subjective   Remains afebrile, no chills  Denies shortness of breath  Denies nausea, vomiting, diarrhea  Denies pain    Objective   Range of Vitals (last 24 hours)  Heart Rate:  [74-81]   Temp:  [36.2 °C (97.2 °F)-36.6 °C (97.9 °F)]   Resp:  [17]   BP: (108-150)/(58-65)   SpO2:  [98 %-100 %]   Daily Weight  07/21/24 : 54.6 kg (120 lb 5.1 oz)    Body mass index is 22.01 kg/m².    Physical Exam  Constitutional:       Appearance: Normal appearance.   HENT:      Head: Normocephalic and atraumatic.   Eyes:      Extraocular Movements: Extraocular movements intact.      Conjunctiva/sclera: Conjunctivae normal.   Cardiovascular:      Rate and Rhythm: Normal rate and regular rhythm.   Pulmonary:      Effort: Pulmonary effort is normal.      Breath sounds: Normal breath sounds.   Abdominal:      General: Bowel sounds are normal.      Palpations: Abdomen is soft.      Tenderness: There is no abdominal tenderness.   Musculoskeletal:         General: Normal range of motion.      Cervical back: Normal range of motion and neck supple.   Skin:     General: Skin is warm and dry.   Neurological:      General: No focal deficit present.      Mental Status: She is alert and oriented to person, place, and time.   Psychiatric:         Mood and Affect: Mood normal.         Behavior: Behavior normal.           Antibiotics  This patient does not have an active medication from one of the medication groupers.    Relevant Results  Labs  Results from last 72 hours   Lab Units 07/24/24  0543 07/23/24  0545 07/22/24  0526 07/21/24 2005   WBC AUTO x10*3/uL 2.1* 2.2* 2.0* 2.5*   HEMOGLOBIN g/dL 9.7* 10.4* 10.5* 11.8*   HEMATOCRIT % 30.0* 32.5* 32.2* 35.5*   PLATELETS AUTO x10*3/uL 160 167 169 181   NEUTROS PCT AUTO %  --   --  55.6 54.7   LYMPHS PCT AUTO %  --   --  29.9 29.4   MONOS PCT AUTO %  --   --  9.0 11.0   EOS PCT AUTO %  --    "--  4.5 3.7     Results from last 72 hours   Lab Units 07/24/24  0543 07/23/24  0545 07/22/24  0526   SODIUM mmol/L 145 144 142   POTASSIUM mmol/L 3.5 3.7 3.6   CHLORIDE mmol/L 111* 111* 105   CO2 mmol/L 23* 23* 28   BUN mg/dL 21 26* 31*   CREATININE mg/dL 2.20* 2.50* 2.90*   GLUCOSE mg/dL 100* 94 91   CALCIUM mg/dL 8.6 9.4 10.4   ANION GAP mmol/L 11 10 9   EGFR mL/min/1.73m*2 21* 18* 15*   PHOSPHORUS mg/dL 3.0 3.2  --      Results from last 72 hours   Lab Units 07/24/24  0543 07/23/24  0545 07/22/24  0526   ALK PHOS U/L  --   --  67   BILIRUBIN TOTAL mg/dL  --   --  0.4   PROTEIN TOTAL g/dL  --   --  5.2*  5.4*   ALT U/L  --   --  <5*   AST U/L  --   --  10   ALBUMIN g/dL 3.1* 3.1* 3.2*     Estimated Creatinine Clearance: 13.4 mL/min (A) (by C-G formula based on SCr of 2.2 mg/dL (H)).  No results found for: \"CRP\"  Microbiology  Reviewed   Imaging  MR brain wo IV contrast    Result Date: 7/23/2024  Interpreted By:  Anjali Cadet, STUDY: MR BRAIN WO IV CONTRAST;  7/23/2024 1:34 pm   INDICATION: Signs/Symptoms:meningioma.   COMPARISON: Head CT July 21, 2024   ACCESSION NUMBER(S): [id number]   ORDERING CLINICIAN: DEANGELO BAKER   TECHNIQUE: Axial T2, FLAIR, DWI, gradient echo T2 and sagittal and coronal T1 weighted images of brain were acquired.   FINDINGS: CSF Spaces: There is prominence of ventricles and sulci compatible with diffuse parenchymal volume loss. There is an extra-axial mass demonstrating diminished signal on T1 weighted imaging measuring approximately 8 mm centered in the left paraclinoid region and corresponding to the partially calcified mass demonstrated on recent CT. There is no appreciable mass effect or midline shift.   Parenchyma: There is no diffusion restriction abnormality to suggest acute infarct.  There are mild hyperintensities on FLAIR and T2 weighted imaging in the subcortical and periventricular white matter. Prominent perivascular spaces and/or remote lacunar infarcts are noted in " the basal ganglia and thalami. There is no mass effect or midline shift.   Paranasal Sinuses and Mastoids: There is minimal mucosal thickening within scattered paranasal sinuses and opacification of a few mastoid air cells.   T1 weighted imaging demonstrates heterogeneous marrow signal throughout the included portion of the cervical spine which is entirely nonspecific. There are also degenerative changes of the visualized portion of the cervical spine.       1. Redemonstration of a partially calcified mass centered in the left paraclinoid region, incompletely assessed on this unenhanced examination but favored to represent a meningioma. 2. Minimal white-matter changes are nonspecific and most likely represent small-vessel ischemic disease in patient of this age. No evidence of acute ischemic injury.     MACRO: None   Signed by: Anjali Cadet 7/23/2024 2:07 PM Dictation workstation:   [id number]    ECG 12 lead    Result Date: 7/22/2024  Normal sinus rhythm Left anterior fascicular block Abnormal ECG No previous ECGs available Confirmed by Leonid Taylor ([id number]) on 7/22/2024 10:32:10 AM    US renal complete    Result Date: 7/22/2024  Interpreted By:  Dianna Peñaloza, STUDY: US RENAL COMPLETE; 7/22/2024 7:52 am   INDICATION: Signs/Symptoms:R/O Hydronephrosis. Renal failure   COMPARISON: None.   ACCESSION NUMBER(S): [id number]   ORDERING CLINICIAN: BRIAN TAYLOR   TECHNIQUE: Grayscale and color Doppler imaging of the kidneys.   FINDINGS: The right kidney measures 7.5 cm in length. The left kidney measures 7.8 cm in length. There is no shadowing calculus, hydronephrosis, or solid mass identified. The renal cortical thickness is within normal range. Renal cortical echogenicity is slightly increased when compared with that of the liver.   Cursory evaluation of the urinary bladder shows bilateral ureteral jets. Urinary bladder is not well distended.       No hydronephrosis of either kidney.   Small kidneys bilaterally with  slight increase in renal cortical echogenicity which may indicate medical renal disease.   MACRO: None.   Signed by: Dianna Peñaloza 7/22/2024 8:04 AM Dictation workstation:   [id number]    CT head wo IV contrast    Result Date: 7/21/2024  Interpreted By:  Becki Bennett, STUDY: CT HEAD WO IV CONTRAST;  7/21/2024 7:19 pm   INDICATION: Signs/Symptoms:fall, AMS.   COMPARISON: None.   ACCESSION NUMBER(S): [id number]   ORDERING CLINICIAN: DEYANIRA NEELY   TECHNIQUE: Axial noncontrast CT images of the head.   FINDINGS: BRAIN PARENCHYMA: Gray-white matter interfaces are preserved. No mass effect or midline shift. Mild nonspecific right lung changes and parenchymal volume loss. Atherosclerotic calcifications noted. 10 mm calcified lesion extends superiorly from the left anterior clinoid process   HEMORRHAGE: No acute intracranial hemorrhage. VENTRICLES and EXTRA-AXIAL SPACES: The ventricles, sulci and basal cisterns enlarged, concordant with parenchymal volume loss. EXTRACRANIAL SOFT TISSUES: Within normal limits. PARANASAL SINUSES/MASTOIDS: The visualized paranasal sinuses and mastoid air cells are aerated. CALVARIUM: No depressed skull fracture. No destructive osseous lesion.   OTHER FINDINGS: None.       No acute intracranial hemorrhage or mass effect   Probable left supraclinoid 10 mm meningioma. MRI with contrast may be considered on a nonemergent basis for further characterization as clinically indicated.   Nonspecific white matter changes and parenchymal volume loss.   MACRO: None.   Signed by: Becki Bennett 7/21/2024 7:53 PM Dictation workstation:   [id number]       Assessment/Plan     Leukopenia-absolute neutrophil count greater than 1000  Acute renal failure syndrome-improving     No indication for neutropenic precautions  Monitor absolute neutrophil count  Monitor renal function  Supportive care  Monitor temperature    Total time spent caring for the patient today was 15 minutes.  This includes time  spent before the visit reviewing the chart, time spent during the visit, and time spent after the visit on documentation.      Jennifer GUAMAN Staff, APRN-CNP

## 2024-07-24 NOTE — NURSING NOTE
Scattered bruising noticed along patient lower abdomen. Noticeable bleeding from previous heparin site. Notified provider and held 1400 heparin dose. Mepilex dressing applied to site.

## 2024-07-24 NOTE — PROGRESS NOTES
07/24/24 1454   Discharge Planning   Expected Discharge Disposition  Services     Waiting for a bed at [facility] due to insurance. [facility] currently has no beds. PT recommendation is for moderate intensity rehab.  Patient is declining SNF.  Is agreeable to [facility].  Accepted by [facility]. Will need DC summary and AVS sent to [facility] to confirm SOC prior to discharge.    1456  UPDATE: Patient has an active discharge order. AVS and discharge summary sent to [facility] via [facility] .  Waiting for SOC confirmation.  Daughter will provide transportation home.

## 2024-07-24 NOTE — DISCHARGE SUMMARY
Discharge Diagnosis  GILBERT (acute kidney injury) (CMS-[facility])    Issues Requiring Follow-Up  Renal function  mentation    Discharge Meds     Your medication list        START taking these medications        Instructions Last Dose Given Next Dose Due   amLODIPine 10 mg tablet  Commonly known as: Norvasc  Start taking on: July 25, 2024      Take 1 tablet (10 mg) by mouth once daily.              CONTINUE taking these medications        Instructions Last Dose Given Next Dose Due   ALPRAZolam 0.25 mg tablet  Commonly known as: Xanax           aspirin 81 mg EC tablet           levothyroxine 75 mcg tablet  Commonly known as: Synthroid, Levoxyl           mirtazapine 7.5 mg tablet  Commonly known as: Remeron           prasugrel 10 mg tablet  Commonly known as: Effient                  STOP taking these medications      lisinopril 2.5 mg tablet                  Where to Get Your Medications        These medications were sent to [facility]  [id number] [address]      Hours: 9 AM to 6 PM Mon-Fri, 9 AM to 1 PM Sat Phone: [phone]   amLODIPine 10 mg tablet         Test Results Pending At Discharge  Pending Labs       Order Current Status    Serum Protein Electrophoresis + Immunofixation In process    Serum Protein Electrophoresis + Immunofixation In process    Urine Protein Electrophoresis + Immunofixation In process    Urine Protein Electrophoresis + Immunofixation In process    Vitamin D 1,25 Dihydroxy (for eval of hypercalcemia) In process            Hospital Course   This is a 89 yo wwoman with history of CKD unknown baseline, HTN, hypothyroidism, dementia who presents for weakness and a fall. Found to have elevated creatinine which improved with IVF. Incidentally found to have meningioma on imaging. Seen by neurology, had MRI with no other findings other than volume loss. Seen by nephrology. Seen by ID for leukopenia that did not appear to be infectious. She was accepted to transfer to  [facility] for insurance reasons, but was ultimately cleared for discharge before a bed opened up at [facility]. She is discharged home with [facility]. Needs blood work in 1 wk and should follow up with neurology and nephrology.    Pertinent Physical Exam At Time of Discharge  Physical Exam  General: alert, no diaphoresis, thin   Lungs: CTA BL   Heart: RRR, no LE edema BL   GI: abdomen soft, nontender, nondistended, BS present   MSK: no joint effusion or deformity   Skin: no rashes, erythema, or ecchymosis   Neuro: grossly normal cognition, motor strength, sensation    Outpatient Follow-Up  No future appointments.    Discharge time spent: 35 min    Daniela Dasilva DO

## 2024-07-24 NOTE — PROGRESS NOTES
Marisela Whitlock is a 89 y.o. female on day 2 of admission presenting with GILBERT (acute kidney injury) (CMS-H[facility]).      Subjective   Patient reports that she feels well today. Says she thinks she is overall getting better. Says she does have a mild headache. No other pain. Appetite isn't great but patient says this is chronic.       Objective     Last Recorded Vitals  /58 (BP Location: Left arm, Patient Position: Lying)   Pulse 74   Temp 36.4 °C (97.5 °F) (Oral)   Resp 17   Wt 54.6 kg (120 lb 5.1 oz)   SpO2 100%   Intake/Output last 3 Shifts:    Intake/Output Summary (Last 24 hours) at 7/24/2024 1312  Last data filed at 7/24/2024 1002  Gross per 24 hour   Intake 465 ml   Output --   Net 465 ml       Admission Weight  Weight: 54.6 kg (120 lb 5.1 oz) (07/21/24 1824)    Daily Weight  07/21/24 : 54.6 kg (120 lb 5.1 oz)    Image Results  MR brain wo IV contrast  Narrative: Interpreted By:  Anjali Cadet,   STUDY:  MR BRAIN WO IV CONTRAST;  7/23/2024 1:34 pm      INDICATION:  Signs/Symptoms:meningioma.      COMPARISON:  Head CT July 21, 2024      ACCESSION NUMBER(S):  [id number]      ORDERING CLINICIAN:  DEANGELO BAKER      TECHNIQUE:  Axial T2, FLAIR, DWI, gradient echo T2 and sagittal and coronal T1  weighted images of brain were acquired.      FINDINGS:  CSF Spaces: There is prominence of ventricles and sulci compatible  with diffuse parenchymal volume loss. There is an extra-axial mass  demonstrating diminished signal on T1 weighted imaging measuring  approximately 8 mm centered in the left paraclinoid region and  corresponding to the partially calcified mass demonstrated on recent  CT. There is no appreciable mass effect or midline shift.      Parenchyma: There is no diffusion restriction abnormality to suggest  acute infarct.  There are mild hyperintensities on FLAIR and T2  weighted imaging in the subcortical and periventricular white matter.  Prominent perivascular spaces and/or remote lacunar infarcts  "are  noted in the basal ganglia and thalami. There is no mass effect or  midline shift.      Paranasal Sinuses and Mastoids: There is minimal mucosal thickening  within scattered paranasal sinuses and opacification of a few mastoid  air cells.      T1 weighted imaging demonstrates heterogeneous marrow signal  throughout the included portion of the cervical spine which is  entirely nonspecific. There are also degenerative changes of the  visualized portion of the cervical spine.      Impression: 1. Redemonstration of a partially calcified mass centered in the left  paraclinoid region, incompletely assessed on this unenhanced  examination but favored to represent a meningioma.  2. Minimal white-matter changes are nonspecific and most likely  represent small-vessel ischemic disease in patient of this age. No  evidence of acute ischemic injury.          MACRO:  None      Signed by: Anjali Cadet 7/23/2024 2:07 PM  Dictation workstation:   [id number]      Physical Exam  General: alert, no diaphoresis, thin   Lungs: CTA BL   Heart: RRR, no LE edema BL   GI: abdomen soft, nontender, nondistended, BS present   MSK: no joint effusion or deformity   Skin: no rashes, erythema, or ecchymosis   Neuro: grossly normal cognition, motor strength, sensation      Relevant Results               Assessment/Plan        Mechanical fall with head trama  - CT head without acute findings. There is a meningioma noted  - PT/OT  - fall precautions    GILBERT VS CKD  - tells me she has history of \"nephritis\" but does not know much else about this. Unknown baseline labs as her previous lab work was apparently in [place]. Daughter tells me she has \"pretty bad\" kidney disease but doesn't know stage.  - nephrologist Dr. Arciniega following  - holding nephrotoxic agents  - creatinine still improving but unknown baseline    Neutropenia  - incidental and does not appear to have any signs of infection. ID saw the patient, no further recommendations at " this point    Hypercalcemia  - resolved with fluid expansion    Anemia  - stable; no signs of bleeding    Hypothyroidism  - unknown home synthroid dose- will resume when we are able    Incidental meningioma on brain imaging  - neurology on consult. Given multiple unwitnessed falls, Dr. Fernando ordered MRI which is poor visualization, but again shows meningioma.    HTN  - ARB on hold due to renal function. Norvasc started and hydralazine PRN  - improved blood pressure with norvasc      DVT ppx        Principal Problem:    GILBERT (acute kidney injury) (CMS-[facility])  Active Problems:    Fall    Meningioma (Multi)    Hypercalcemia    Neutropenia (CMS-[facility])    Acute kidney injury (CMS-[facility])           Malnutrition Diagnosis Status: New  Malnutrition Diagnosis: Severe malnutrition related to chronic disease or condition  As Evidenced by: moderate fat and muscle deficits, PO intake < 75% for > 1 month  I agree with the dietitian's malnutrition diagnosis.        Dispo: refusing SNF, plan for home with home health care. UR contacted me saying patient has [employer] [facility] preferred medicare. They requested that we transfer to [facility] if able. Accepted at [facility] by Dr. Prescott. No beds at this time. Will continue taking care of patient as outlined above until she can be transferred or until she can be discharged, whichever happens first..    Anticipate discharge within next 24 hr if okay with Dr. Arciniega, who I have messaged. Will discuss timing of discharge with patient's daughter.       Daniela Dasilva,

## 2024-07-27 NOTE — ED PROVIDER NOTES
HPI   No chief complaint on file.      89-year-old female presents after a fall.  Daughter at bedside providing history.  Patient reportedly fell, striking her head against a TV.  She reportedly was on the floor for a prolonged period of time prior to being assisted back up.  She then took a nap and upon waking up from her nap, she is reportedly more confused and weak which is not normal.  This prompted family to bring patient to the emergency department for evaluation.  At this time patient denies headache.  No numbness or tingling.  No weakness.              Patient History   Past Medical History:   Diagnosis Date    COPD (chronic obstructive pulmonary disease) (Multi)      No past surgical history on file.  No family history on file.  Social History     Tobacco Use    Smoking status: Former     Current packs/day: 0.00     Types: Cigarettes     Quit date:      Years since quittin.5    Smokeless tobacco: Never   Substance Use Topics    Alcohol use: Not on file    Drug use: Not on file       Physical Exam   ED Triage Vitals   Temp Heart Rate Resp BP   24 1824 24 1824 24 1824 24 1824   36.7 °C (98.1 °F) 76 16 147/86      SpO2 Temp Source Heart Rate Source Patient Position   24 1824 24 1824 24 1824 24 1824   97 % Oral Monitor Sitting      BP Location FiO2 (%)     24 2202 --     Left arm        Physical Exam  Vitals and nursing note reviewed.   Constitutional:       General: She is not in acute distress.     Appearance: She is not ill-appearing.   HENT:      Head: Normocephalic and atraumatic.      Mouth/Throat:      Mouth: Mucous membranes are moist.      Pharynx: Oropharynx is clear.   Eyes:      Extraocular Movements: Extraocular movements intact.      Conjunctiva/sclera: Conjunctivae normal.      Pupils: Pupils are equal, round, and reactive to light.   Cardiovascular:      Rate and Rhythm: Normal rate and regular rhythm.   Pulmonary:      Effort:  Pulmonary effort is normal. No respiratory distress.      Breath sounds: Normal breath sounds.   Abdominal:      General: There is no distension.      Palpations: Abdomen is soft.      Tenderness: There is no abdominal tenderness.   Musculoskeletal:         General: No swelling or deformity. Normal range of motion.      Cervical back: Normal range of motion and neck supple.   Skin:     General: Skin is warm and dry.      Capillary Refill: Capillary refill takes less than 2 seconds.      Comments: Skin tear noted to left forearm with Kerlix dressing in place   Neurological:      General: No focal deficit present.      Mental Status: She is alert and oriented to person, place, and time. Mental status is at baseline.      Cranial Nerves: Cranial nerves 2-12 are intact.      Sensory: Sensation is intact.      Motor: Motor function is intact. No pronator drift.      Coordination: Coordination is intact.   Psychiatric:         Mood and Affect: Mood normal.         Behavior: Behavior normal.           ED Course & [facility]   ED Course as of 07/26/24 2235   Sun Jul 21, 2024 1838 ECG 12 lead  Performed at  1834, HR of 72, NSR, NAD, QTc 453, no sign of STEMI, no Q wave or T wave abnormality noted.    Reviewed and interpreted by me at time performed   []   2005 CT head wo IV contrast  IMPRESSION:  No acute intracranial hemorrhage or mass effect      Probable left supraclinoid 10 mm meningioma. MRI with contrast may be  considered on a nonemergent basis for further characterization as  clinically indicated.      Nonspecific white matter changes and parenchymal volume loss. []   2053 Urinalysis with Reflex Culture and Microscopic  No infection []   2053 Creatinine(!): 2.80  No documented baseline []   2053 HEMOGLOBIN(!): 11.8  No documented baseline []      ED Course User Index  [] Maty Mijares MD         Diagnoses as of 07/26/24 2235   GILBERT (acute kidney injury) (CMS-[facility])   Hypercalcemia   Fall, initial  encounter                       No data recorded                      Medical Decision Making  89 y.o. female presents after mechanical fall.  I have considered the following conditions during my assessment of this patient's condition: Head injury, cervical/thoracic/lumbar injury, extremity fracture/dislocation.  No obvious deformity seen on examination.  Patient with a known skin tear, cleaned and dressed by ED tech.  Head CT negative for intracranial injury.  Neck without any midline tenderness, full range of motion, no paresthesias.  Labs notable for elevated creatinine and diminished GFR.  No prior labs in the system to compare to.  Spoke with patient's daughter who states this is not the patient's baseline renal function.  Urinalysis obtained, no evidence of infection.  Given concern for GILBERT and recent fall, patient would benefit from admission for further management.        Procedure  Procedures     Maty Mijares MD  07/26/24 7921

## 2024-08-06 ENCOUNTER — PHARMACY VISIT (OUTPATIENT)
Dept: PHARMACY | Facility: CLINIC | Age: 89
End: 2024-08-06
Payer: COMMERCIAL

## 2024-08-06 PROCEDURE — RXMED WILLOW AMBULATORY MEDICATION CHARGE
